# Patient Record
Sex: FEMALE | Race: BLACK OR AFRICAN AMERICAN | NOT HISPANIC OR LATINO
[De-identification: names, ages, dates, MRNs, and addresses within clinical notes are randomized per-mention and may not be internally consistent; named-entity substitution may affect disease eponyms.]

---

## 2017-01-19 ENCOUNTER — RESULT REVIEW (OUTPATIENT)
Age: 62
End: 2017-01-19

## 2017-02-21 ENCOUNTER — OUTPATIENT (OUTPATIENT)
Dept: OUTPATIENT SERVICES | Facility: HOSPITAL | Age: 62
LOS: 1 days | End: 2017-02-21
Payer: COMMERCIAL

## 2017-02-21 ENCOUNTER — APPOINTMENT (OUTPATIENT)
Dept: ULTRASOUND IMAGING | Facility: IMAGING CENTER | Age: 62
End: 2017-02-21

## 2017-02-21 ENCOUNTER — APPOINTMENT (OUTPATIENT)
Dept: MAMMOGRAPHY | Facility: IMAGING CENTER | Age: 62
End: 2017-02-21

## 2017-02-21 ENCOUNTER — APPOINTMENT (OUTPATIENT)
Dept: RADIOLOGY | Facility: IMAGING CENTER | Age: 62
End: 2017-02-21

## 2017-02-21 DIAGNOSIS — Z00.8 ENCOUNTER FOR OTHER GENERAL EXAMINATION: ICD-10-CM

## 2017-02-21 PROCEDURE — 77066 DX MAMMO INCL CAD BI: CPT

## 2017-02-21 PROCEDURE — 76830 TRANSVAGINAL US NON-OB: CPT

## 2017-02-21 PROCEDURE — 77080 DXA BONE DENSITY AXIAL: CPT

## 2017-02-21 PROCEDURE — G0279: CPT

## 2017-02-21 PROCEDURE — 76856 US EXAM PELVIC COMPLETE: CPT

## 2017-02-28 DIAGNOSIS — Z12.31 ENCOUNTER FOR SCREENING MAMMOGRAM FOR MALIGNANT NEOPLASM OF BREAST: ICD-10-CM

## 2017-02-28 DIAGNOSIS — R10.31 RIGHT LOWER QUADRANT PAIN: ICD-10-CM

## 2017-02-28 DIAGNOSIS — Z78.0 ASYMPTOMATIC MENOPAUSAL STATE: ICD-10-CM

## 2017-04-09 ENCOUNTER — EMERGENCY (EMERGENCY)
Facility: HOSPITAL | Age: 62
LOS: 1 days | Discharge: HOME | End: 2017-04-09
Attending: EMERGENCY MEDICINE | Admitting: EMERGENCY MEDICINE
Payer: COMMERCIAL

## 2017-04-09 VITALS
RESPIRATION RATE: 17 BRPM | OXYGEN SATURATION: 98 % | DIASTOLIC BLOOD PRESSURE: 99 MMHG | SYSTOLIC BLOOD PRESSURE: 159 MMHG | HEART RATE: 69 BPM

## 2017-04-09 VITALS
RESPIRATION RATE: 17 BRPM | TEMPERATURE: 98 F | DIASTOLIC BLOOD PRESSURE: 101 MMHG | OXYGEN SATURATION: 98 % | SYSTOLIC BLOOD PRESSURE: 165 MMHG | HEART RATE: 78 BPM

## 2017-04-09 DIAGNOSIS — E87.5 HYPERKALEMIA: ICD-10-CM

## 2017-04-09 LAB
ALBUMIN SERPL ELPH-MCNC: 3.8 G/DL — SIGNIFICANT CHANGE UP (ref 3.3–5)
ALP SERPL-CCNC: 67 U/L — SIGNIFICANT CHANGE UP (ref 40–120)
ALT FLD-CCNC: 22 U/L RC — SIGNIFICANT CHANGE UP (ref 10–45)
ANION GAP SERPL CALC-SCNC: 11 MMOL/L — SIGNIFICANT CHANGE UP (ref 5–17)
AST SERPL-CCNC: 20 U/L — SIGNIFICANT CHANGE UP (ref 10–40)
BILIRUB SERPL-MCNC: 0.7 MG/DL — SIGNIFICANT CHANGE UP (ref 0.2–1.2)
BUN SERPL-MCNC: 17 MG/DL — SIGNIFICANT CHANGE UP (ref 7–23)
CALCIUM SERPL-MCNC: 9.6 MG/DL — SIGNIFICANT CHANGE UP (ref 8.4–10.5)
CHLORIDE SERPL-SCNC: 104 MMOL/L — SIGNIFICANT CHANGE UP (ref 96–108)
CO2 SERPL-SCNC: 25 MMOL/L — SIGNIFICANT CHANGE UP (ref 22–31)
CREAT SERPL-MCNC: 1.05 MG/DL — SIGNIFICANT CHANGE UP (ref 0.5–1.3)
GAS PNL BLDV: SIGNIFICANT CHANGE UP
GLUCOSE SERPL-MCNC: 98 MG/DL — SIGNIFICANT CHANGE UP (ref 70–99)
HCT VFR BLD CALC: 36.7 % — SIGNIFICANT CHANGE UP (ref 34.5–45)
HGB BLD-MCNC: 12.8 G/DL — SIGNIFICANT CHANGE UP (ref 11.5–15.5)
MCHC RBC-ENTMCNC: 27.1 PG — SIGNIFICANT CHANGE UP (ref 27–34)
MCHC RBC-ENTMCNC: 34.7 GM/DL — SIGNIFICANT CHANGE UP (ref 32–36)
MCV RBC AUTO: 78.1 FL — LOW (ref 80–100)
PLATELET # BLD AUTO: 208 K/UL — SIGNIFICANT CHANGE UP (ref 150–400)
POTASSIUM SERPL-MCNC: 4.4 MMOL/L — SIGNIFICANT CHANGE UP (ref 3.5–5.3)
POTASSIUM SERPL-SCNC: 4.4 MMOL/L — SIGNIFICANT CHANGE UP (ref 3.5–5.3)
PROT SERPL-MCNC: 6.8 G/DL — SIGNIFICANT CHANGE UP (ref 6–8.3)
RBC # BLD: 4.7 M/UL — SIGNIFICANT CHANGE UP (ref 3.8–5.2)
RBC # FLD: 12.7 % — SIGNIFICANT CHANGE UP (ref 10.3–14.5)
SODIUM SERPL-SCNC: 140 MMOL/L — SIGNIFICANT CHANGE UP (ref 135–145)
WBC # BLD: 7.2 K/UL — SIGNIFICANT CHANGE UP (ref 3.8–10.5)
WBC # FLD AUTO: 7.2 K/UL — SIGNIFICANT CHANGE UP (ref 3.8–10.5)

## 2017-04-09 PROCEDURE — 82947 ASSAY GLUCOSE BLOOD QUANT: CPT

## 2017-04-09 PROCEDURE — 82803 BLOOD GASES ANY COMBINATION: CPT

## 2017-04-09 PROCEDURE — 99284 EMERGENCY DEPT VISIT MOD MDM: CPT | Mod: 25

## 2017-04-09 PROCEDURE — 84295 ASSAY OF SERUM SODIUM: CPT

## 2017-04-09 PROCEDURE — 84132 ASSAY OF SERUM POTASSIUM: CPT

## 2017-04-09 PROCEDURE — 99283 EMERGENCY DEPT VISIT LOW MDM: CPT | Mod: 25

## 2017-04-09 PROCEDURE — 85027 COMPLETE CBC AUTOMATED: CPT

## 2017-04-09 PROCEDURE — 82330 ASSAY OF CALCIUM: CPT

## 2017-04-09 PROCEDURE — 83605 ASSAY OF LACTIC ACID: CPT

## 2017-04-09 PROCEDURE — 82435 ASSAY OF BLOOD CHLORIDE: CPT

## 2017-04-09 PROCEDURE — 93005 ELECTROCARDIOGRAM TRACING: CPT

## 2017-04-09 PROCEDURE — 93010 ELECTROCARDIOGRAM REPORT: CPT

## 2017-04-09 PROCEDURE — 80053 COMPREHEN METABOLIC PANEL: CPT

## 2017-04-09 PROCEDURE — 85014 HEMATOCRIT: CPT

## 2017-04-09 NOTE — ED ADULT TRIAGE NOTE - CHIEF COMPLAINT QUOTE
Sent to ED by Dr Vanegas for high potassium (patient reports that potassium is 6.9). Blood was drawn yesterday.

## 2017-04-09 NOTE — ED PROVIDER NOTE - CARE PLAN
Principal Discharge DX:	Hyperkalemia  Goal:	Normal potassium level  Instructions for follow-up, activity and diet:	Please follow-up with your primary care physician within 24-28hrs  Please return to the ED for worsening or new symptoms

## 2017-04-09 NOTE — ED PROVIDER NOTE - ATTENDING CONTRIBUTION TO CARE
No acute symptoms, referred to ED for hyperkalemia on outpatient specimen, on my exam:  awake, alert, cooperative, pleasant

## 2017-04-09 NOTE — ED PROVIDER NOTE - OBJECTIVE STATEMENT
62 yo F PMHx HTN presents from home s/p receiving blood work results significant for K 6.9. Patient's PCP instructed patient to report to the ED for lab work and management. Patient has no complaints at this time. No chest pain, sob/dyspnea, abdominal pain, bleeding.    No EKG changes/abnormalities present on arrival

## 2017-04-09 NOTE — ED PROVIDER NOTE - PLAN OF CARE
Normal potassium level Please follow-up with your primary care physician within 24-28hrs  Please return to the ED for worsening or new symptoms

## 2017-04-09 NOTE — ED ADULT NURSE NOTE - OBJECTIVE STATEMENT
62 y/o F, reported to ED from home. A&Ox3, c/o abnormal labs. Pt reports that yesterday she had a physical preformed by her PMD. PMD called pt to tell her that her potassium was high. Pt states that the PMD said her potassium was 6.9. Pt denies any symptoms or complaints at this time. Pt denies LOC, H/A, or visual changes. Pt denies SOB, C/P, N/V/D, abd pain. Pt denies fever or chills. Pt denies pain. Will continue to monitor pt.

## 2017-04-09 NOTE — ED PROVIDER NOTE - MEDICAL DECISION MAKING DETAILS
60 yo F HTN presents from home for outpatient potassium of 6.9; here for repeat blood work +/- management  -CBC, CMP, VBG c lytes

## 2017-04-09 NOTE — ED ADULT NURSE NOTE - CHIEF COMPLAINT
The patient is a 61y Female complaining of The patient is a 61y Female complaining of abnormal labs.

## 2017-04-09 NOTE — ED PROVIDER NOTE - CHPI ED SYMPTOMS NEG
no vomiting/no fever/no dizziness/no chills/no tingling/no numbness/no pain/no weakness/no nausea/no decreased eating/drinking

## 2018-02-26 ENCOUNTER — OUTPATIENT (OUTPATIENT)
Dept: OUTPATIENT SERVICES | Facility: HOSPITAL | Age: 63
LOS: 1 days | End: 2018-02-26
Payer: COMMERCIAL

## 2018-02-26 ENCOUNTER — APPOINTMENT (OUTPATIENT)
Dept: MAMMOGRAPHY | Facility: IMAGING CENTER | Age: 63
End: 2018-02-26
Payer: COMMERCIAL

## 2018-02-26 DIAGNOSIS — Z00.8 ENCOUNTER FOR OTHER GENERAL EXAMINATION: ICD-10-CM

## 2018-02-26 PROCEDURE — 77063 BREAST TOMOSYNTHESIS BI: CPT | Mod: 26

## 2018-02-26 PROCEDURE — 77067 SCR MAMMO BI INCL CAD: CPT

## 2018-02-26 PROCEDURE — 77067 SCR MAMMO BI INCL CAD: CPT | Mod: 26

## 2018-02-26 PROCEDURE — 77063 BREAST TOMOSYNTHESIS BI: CPT

## 2019-02-28 ENCOUNTER — APPOINTMENT (OUTPATIENT)
Dept: MAMMOGRAPHY | Facility: IMAGING CENTER | Age: 64
End: 2019-02-28
Payer: COMMERCIAL

## 2019-02-28 ENCOUNTER — OUTPATIENT (OUTPATIENT)
Dept: OUTPATIENT SERVICES | Facility: HOSPITAL | Age: 64
LOS: 1 days | End: 2019-02-28
Payer: COMMERCIAL

## 2019-02-28 DIAGNOSIS — Z00.8 ENCOUNTER FOR OTHER GENERAL EXAMINATION: ICD-10-CM

## 2019-02-28 PROCEDURE — 77063 BREAST TOMOSYNTHESIS BI: CPT

## 2019-02-28 PROCEDURE — 77067 SCR MAMMO BI INCL CAD: CPT

## 2019-02-28 PROCEDURE — 77063 BREAST TOMOSYNTHESIS BI: CPT | Mod: 26

## 2019-02-28 PROCEDURE — 77067 SCR MAMMO BI INCL CAD: CPT | Mod: 26

## 2019-03-27 ENCOUNTER — RESULT REVIEW (OUTPATIENT)
Age: 64
End: 2019-03-27

## 2019-07-29 NOTE — ED PROVIDER NOTE - CROS ED EYES ALL NEG
no paresthesia/fingers/toes warm to touch/no cyanosis of extremity/capillary refill time < 2 seconds/no swelling
negative...

## 2019-08-26 ENCOUNTER — APPOINTMENT (OUTPATIENT)
Dept: ORTHOPEDIC SURGERY | Facility: CLINIC | Age: 64
End: 2019-08-26
Payer: COMMERCIAL

## 2019-08-26 VITALS
SYSTOLIC BLOOD PRESSURE: 137 MMHG | HEART RATE: 77 BPM | DIASTOLIC BLOOD PRESSURE: 83 MMHG | WEIGHT: 204 LBS | BODY MASS INDEX: 32.78 KG/M2 | HEIGHT: 66 IN

## 2019-08-26 DIAGNOSIS — Z82.61 FAMILY HISTORY OF ARTHRITIS: ICD-10-CM

## 2019-08-26 DIAGNOSIS — Z86.79 PERSONAL HISTORY OF OTHER DISEASES OF THE CIRCULATORY SYSTEM: ICD-10-CM

## 2019-08-26 PROCEDURE — 73562 X-RAY EXAM OF KNEE 3: CPT | Mod: LT

## 2019-08-26 PROCEDURE — 20610 DRAIN/INJ JOINT/BURSA W/O US: CPT | Mod: LT

## 2019-08-26 PROCEDURE — 99204 OFFICE O/P NEW MOD 45 MIN: CPT | Mod: 25

## 2019-08-26 RX ORDER — ASPIRIN 81 MG
81 TABLET, DELAYED RELEASE (ENTERIC COATED) ORAL
Refills: 0 | Status: ACTIVE | COMMUNITY

## 2019-08-26 RX ORDER — VALSARTAN 40 MG/1
TABLET, COATED ORAL
Refills: 0 | Status: ACTIVE | COMMUNITY

## 2019-08-26 RX ADMIN — METHYLPREDNISOLONE ACETATE 2 MG/ML: 40 INJECTION, SUSPENSION INTRALESIONAL; INTRAMUSCULAR; INTRASYNOVIAL; SOFT TISSUE at 00:00

## 2019-08-26 RX ADMIN — LIDOCAINE HYDROCHLORIDE 3 %: 10 INJECTION, SOLUTION INFILTRATION; PERINEURAL at 00:00

## 2019-08-30 RX ORDER — LIDOCAINE HYDROCHLORIDE 10 MG/ML
1 INJECTION, SOLUTION INFILTRATION; PERINEURAL
Refills: 0 | Status: COMPLETED | OUTPATIENT
Start: 2019-08-26

## 2019-08-30 RX ORDER — METHYLPRED ACET/NACL,ISO-OS/PF 40 MG/ML
40 VIAL (ML) INJECTION
Qty: 1 | Refills: 0 | Status: COMPLETED | OUTPATIENT
Start: 2019-08-26

## 2019-09-30 ENCOUNTER — APPOINTMENT (OUTPATIENT)
Dept: ORTHOPEDIC SURGERY | Facility: CLINIC | Age: 64
End: 2019-09-30

## 2019-12-15 ENCOUNTER — INPATIENT (INPATIENT)
Facility: HOSPITAL | Age: 64
LOS: 3 days | Discharge: HOME | DRG: 378 | End: 2019-12-19
Attending: INTERNAL MEDICINE | Admitting: INTERNAL MEDICINE
Payer: COMMERCIAL

## 2019-12-15 VITALS
WEIGHT: 199.08 LBS | HEART RATE: 101 BPM | RESPIRATION RATE: 18 BRPM | OXYGEN SATURATION: 99 % | TEMPERATURE: 98 F | SYSTOLIC BLOOD PRESSURE: 139 MMHG | DIASTOLIC BLOOD PRESSURE: 86 MMHG | HEIGHT: 65 IN

## 2019-12-15 DIAGNOSIS — K92.2 GASTROINTESTINAL HEMORRHAGE, UNSPECIFIED: ICD-10-CM

## 2019-12-15 LAB
ALBUMIN SERPL ELPH-MCNC: 3.9 G/DL — SIGNIFICANT CHANGE UP (ref 3.3–5)
ALP SERPL-CCNC: 69 U/L — SIGNIFICANT CHANGE UP (ref 40–120)
ALT FLD-CCNC: 19 U/L — SIGNIFICANT CHANGE UP (ref 10–45)
ANION GAP SERPL CALC-SCNC: 9 MMOL/L — SIGNIFICANT CHANGE UP (ref 5–17)
APTT BLD: 31.3 SEC — SIGNIFICANT CHANGE UP (ref 27.5–36.3)
AST SERPL-CCNC: 21 U/L — SIGNIFICANT CHANGE UP (ref 10–40)
BASOPHILS # BLD AUTO: 0.03 K/UL — SIGNIFICANT CHANGE UP (ref 0–0.2)
BASOPHILS NFR BLD AUTO: 0.3 % — SIGNIFICANT CHANGE UP (ref 0–2)
BILIRUB SERPL-MCNC: 0.7 MG/DL — SIGNIFICANT CHANGE UP (ref 0.2–1.2)
BLD GP AB SCN SERPL QL: NEGATIVE — SIGNIFICANT CHANGE UP
BUN SERPL-MCNC: 16 MG/DL — SIGNIFICANT CHANGE UP (ref 7–23)
CALCIUM SERPL-MCNC: 10.3 MG/DL — SIGNIFICANT CHANGE UP (ref 8.4–10.5)
CHLORIDE SERPL-SCNC: 106 MMOL/L — SIGNIFICANT CHANGE UP (ref 96–108)
CO2 SERPL-SCNC: 24 MMOL/L — SIGNIFICANT CHANGE UP (ref 22–31)
CREAT SERPL-MCNC: 1.25 MG/DL — SIGNIFICANT CHANGE UP (ref 0.5–1.3)
EOSINOPHIL # BLD AUTO: 0.15 K/UL — SIGNIFICANT CHANGE UP (ref 0–0.5)
EOSINOPHIL NFR BLD AUTO: 1.7 % — SIGNIFICANT CHANGE UP (ref 0–6)
GLUCOSE SERPL-MCNC: 93 MG/DL — SIGNIFICANT CHANGE UP (ref 70–99)
HCT VFR BLD CALC: 35 % — SIGNIFICANT CHANGE UP (ref 34.5–45)
HCT VFR BLD CALC: 39.7 % — SIGNIFICANT CHANGE UP (ref 34.5–45)
HGB BLD-MCNC: 11.3 G/DL — LOW (ref 11.5–15.5)
HGB BLD-MCNC: 12.9 G/DL — SIGNIFICANT CHANGE UP (ref 11.5–15.5)
IMM GRANULOCYTES NFR BLD AUTO: 0.2 % — SIGNIFICANT CHANGE UP (ref 0–1.5)
INR BLD: 1.05 RATIO — SIGNIFICANT CHANGE UP (ref 0.88–1.16)
LACTATE BLDV-MCNC: 1.4 MMOL/L — SIGNIFICANT CHANGE UP (ref 0.7–2)
LYMPHOCYTES # BLD AUTO: 2.39 K/UL — SIGNIFICANT CHANGE UP (ref 1–3.3)
LYMPHOCYTES # BLD AUTO: 26.6 % — SIGNIFICANT CHANGE UP (ref 13–44)
MCHC RBC-ENTMCNC: 25.3 PG — LOW (ref 27–34)
MCHC RBC-ENTMCNC: 25.6 PG — LOW (ref 27–34)
MCHC RBC-ENTMCNC: 32.3 GM/DL — SIGNIFICANT CHANGE UP (ref 32–36)
MCHC RBC-ENTMCNC: 32.5 GM/DL — SIGNIFICANT CHANGE UP (ref 32–36)
MCV RBC AUTO: 78.5 FL — LOW (ref 80–100)
MCV RBC AUTO: 78.8 FL — LOW (ref 80–100)
MONOCYTES # BLD AUTO: 0.8 K/UL — SIGNIFICANT CHANGE UP (ref 0–0.9)
MONOCYTES NFR BLD AUTO: 8.9 % — SIGNIFICANT CHANGE UP (ref 2–14)
NEUTROPHILS # BLD AUTO: 5.6 K/UL — SIGNIFICANT CHANGE UP (ref 1.8–7.4)
NEUTROPHILS NFR BLD AUTO: 62.3 % — SIGNIFICANT CHANGE UP (ref 43–77)
NRBC # BLD: 0 /100 WBCS — SIGNIFICANT CHANGE UP (ref 0–0)
NRBC # BLD: 0 /100 WBCS — SIGNIFICANT CHANGE UP (ref 0–0)
PLATELET # BLD AUTO: 236 K/UL — SIGNIFICANT CHANGE UP (ref 150–400)
PLATELET # BLD AUTO: 261 K/UL — SIGNIFICANT CHANGE UP (ref 150–400)
POTASSIUM SERPL-MCNC: 4 MMOL/L — SIGNIFICANT CHANGE UP (ref 3.5–5.3)
POTASSIUM SERPL-SCNC: 4 MMOL/L — SIGNIFICANT CHANGE UP (ref 3.5–5.3)
PROT SERPL-MCNC: 7.3 G/DL — SIGNIFICANT CHANGE UP (ref 6–8.3)
PROTHROM AB SERPL-ACNC: 12 SEC — SIGNIFICANT CHANGE UP (ref 10–12.9)
RBC # BLD: 4.46 M/UL — SIGNIFICANT CHANGE UP (ref 3.8–5.2)
RBC # BLD: 5.04 M/UL — SIGNIFICANT CHANGE UP (ref 3.8–5.2)
RBC # FLD: 13 % — SIGNIFICANT CHANGE UP (ref 10.3–14.5)
RBC # FLD: 13.2 % — SIGNIFICANT CHANGE UP (ref 10.3–14.5)
RH IG SCN BLD-IMP: POSITIVE — SIGNIFICANT CHANGE UP
SODIUM SERPL-SCNC: 139 MMOL/L — SIGNIFICANT CHANGE UP (ref 135–145)
WBC # BLD: 8.92 K/UL — SIGNIFICANT CHANGE UP (ref 3.8–10.5)
WBC # BLD: 8.99 K/UL — SIGNIFICANT CHANGE UP (ref 3.8–10.5)
WBC # FLD AUTO: 8.92 K/UL — SIGNIFICANT CHANGE UP (ref 3.8–10.5)
WBC # FLD AUTO: 8.99 K/UL — SIGNIFICANT CHANGE UP (ref 3.8–10.5)

## 2019-12-15 PROCEDURE — 99285 EMERGENCY DEPT VISIT HI MDM: CPT

## 2019-12-15 RX ORDER — SODIUM CHLORIDE 9 MG/ML
1000 INJECTION INTRAMUSCULAR; INTRAVENOUS; SUBCUTANEOUS ONCE
Refills: 0 | Status: COMPLETED | OUTPATIENT
Start: 2019-12-15 | End: 2019-12-16

## 2019-12-15 NOTE — ED ADULT NURSE NOTE - OBJECTIVE STATEMENT
pt 65 yo female presents to [place] area states rectal bleed x 3 today with stool onset 1pm pt states last colonoscopy 8 years ago mild lower abd cramping per patient pt accompnied by son to er pt vitals stable pt pending md evaluation

## 2019-12-15 NOTE — H&P ADULT - HISTORY OF PRESENT ILLNESS
63 y/o F h/o HTN on [id number], presenting with several episodes of BRBPR today.  3 episodes. 1st episode mixed with stool, next 2 all blood.  small amounts of clots.  No nausea/vomiting.  mild cramping in RLQ upon arrival to Ed.  No fever/chills.  Had a colonscopy (screening) ~8 y/ago; had a number of polyps removed, no cancer per patient; no prior h/o GI bleeding.     at present in ED : she has 2 more episode of BRBPR , denies any abd pain , Her H/H is droping   will order CT angie of abd/pelvis

## 2019-12-15 NOTE — H&P ADULT - PROBLEM SELECTOR PLAN 1
several episodes of BRBPR   on admission Hb 12.5 ---> 10   will obtain GI consult Dr. Mistry as per patinet request   -will get CT angio of abd and pelvis : if bleeder identified , then IR consult for possible embolization  -started on protonix IV   -keep her NPO

## 2019-12-15 NOTE — H&P ADULT - NSHPPHYSICALEXAM_GEN_ALL_CORE
pt. seen and examined, denies any abd pain     Vital Signs Last 24 Hrs  T(C): 36.7 (15 Dec 2019 22:52), Max: 36.9 (15 Dec 2019 14:36)  T(F): 98 (15 Dec 2019 22:52), Max: 98.4 (15 Dec 2019 14:36)  HR: 66 (16 Dec 2019 00:21) (66 - 101)  BP: 120/70 (16 Dec 2019 00:21) (82/55 - 139/86)  BP(mean): --  RR: 18 (15 Dec 2019 23:59) (16 - 20)  SpO2: 98% (15 Dec 2019 23:59) (98% - 99%)    heent: nc/at, no pallor  neck: supple, no JVD  Lungs: B/L clear, no w/r/r  heart: s1s2 nml  abd: soft, NABS, NT/ND  ext: no e/c/c, pulses 2+  neuro: aaox3, no focal deficit

## 2019-12-15 NOTE — ED PROVIDER NOTE - PROGRESS NOTE DETAILS
Attending note (Thuan): patient had large red blood bowel movement in toilet in [place] (observed by MD); concern for persistent/active lower GI bleed, but not hemodynamically unstable; initial hgb/hct normal (will repeat at 8pm); will admit for further evaluation/management.

## 2019-12-15 NOTE — H&P ADULT - NSHPLABSRESULTS_GEN_ALL_CORE
10.9   10.85 )-----------( 207      ( 16 Dec 2019 00:16 )             34.1     12-15    139  |  106  |  16  ----------------------------<  93  4.0   |  24  |  1.25    Ca    10.3      15 Dec 2019 16:14    TPro  7.3  /  Alb  3.9  /  TBili  0.7  /  DBili  x   /  AST  21  /  ALT  19  /  AlkPhos  69  12-15

## 2019-12-15 NOTE — ED PROVIDER NOTE - ATTENDING CONTRIBUTION TO CARE
65 y/o F h/o HTN on [id number], presenting with several episodes of BRBPR today.  3 episodes. 1st episode mixed with stool, next 2 all blood.  small amounts of clots.  No nausea/vomiting.  mild cramping in RLQ upon arrival to Ed.  No fever/chills.  Had a colonscopy (screening) ~8 y/ago; had a number of polyps removed, no cancer per patient; no prior h/o GI bleeding.      meds: diovan (160 bid), ASA 81mg  PMH: HTN  GI Janki  PCP: Jose Daniel    On Physical Exam:  General: well appearing, in NAD, speaking clearly in full sentences and without difficulty; cooperative with exam  HEENT: PERRL, MMM  Neck: no neck tenderness, no nuchal rigidity  Cardiac: normal s1, s2; RRR; no MGR  Lungs: CTABL  Abdomen: soft nontender/nondistended  : no bladder tenderness or distension  Skin: intact, no rash  Extremities: no peripheral edema, no gross deformities  Neuro: no gross neurologic deficits     AP:  65y/o F h/o HTN on ASA 81mg qd presenting with mutliple episodes of BRBPR today concerning for lower GI bleed; no abdominal tenderness, hemodynamically stable; will check labs, given use of antiplatelets and multiple episodes, may need admission for monitoring, further GI w/u. transfusion if significantly anemic.      ED Course: patient had large red blood bowel movement in toilet in [place] (observed by MD); concern for persistent/active lower GI bleed, but not hemodynamically unstable; initial hgb/hct normal (will repeat at 8pm); will admit for further evaluation/management.

## 2019-12-15 NOTE — ED PROVIDER NOTE - CLINICAL SUMMARY MEDICAL DECISION MAKING FREE TEXT BOX
Attending note (Thuan): 63y/o F h/o HTN on ASA 81mg qd presenting with mutliple episodes of BRBPR today concerning for lower GI bleed; no abdominal tenderness, hemodynamically stable; will check labs, given use of antiplatelets and multiple episodes, may need admission for monitoring, further GI w/u. transfusion if significantly anemic.

## 2019-12-15 NOTE — ED PROVIDER NOTE - OBJECTIVE STATEMENT
Attending note (Thuan): 63 y/o F h/o HTN on [id number], presenting with several episodes of BRBPR today.  3 episodes. 1st episode mixed with stool, next 2 all blood.  small amounts of clots.  No nausea/vomiting.  mild cramping in RLQ upon arrival to Ed.  No fever/chills.  Had a colonscopy (screening) ~8 y/ago; had a number of polyps removed, no cancer per patient; no prior h/o GI bleeding.      meds: diovan (160 bid), ASA 81mg  PMH: HTN  GI Janki  PCP: Jose Daniel

## 2019-12-16 DIAGNOSIS — D64.9 ANEMIA, UNSPECIFIED: ICD-10-CM

## 2019-12-16 DIAGNOSIS — N17.9 ACUTE KIDNEY FAILURE, UNSPECIFIED: ICD-10-CM

## 2019-12-16 DIAGNOSIS — K92.2 GASTROINTESTINAL HEMORRHAGE, UNSPECIFIED: ICD-10-CM

## 2019-12-16 DIAGNOSIS — I10 ESSENTIAL (PRIMARY) HYPERTENSION: ICD-10-CM

## 2019-12-16 LAB
ALBUMIN SERPL ELPH-MCNC: 3.5 G/DL — SIGNIFICANT CHANGE UP (ref 3.3–5)
ALP SERPL-CCNC: 60 U/L — SIGNIFICANT CHANGE UP (ref 40–120)
ALT FLD-CCNC: 14 U/L — SIGNIFICANT CHANGE UP (ref 10–45)
ANION GAP SERPL CALC-SCNC: 12 MMOL/L — SIGNIFICANT CHANGE UP (ref 5–17)
AST SERPL-CCNC: 18 U/L — SIGNIFICANT CHANGE UP (ref 10–40)
BILIRUB SERPL-MCNC: 0.5 MG/DL — SIGNIFICANT CHANGE UP (ref 0.2–1.2)
BUN SERPL-MCNC: 15 MG/DL — SIGNIFICANT CHANGE UP (ref 7–23)
CALCIUM SERPL-MCNC: 9.7 MG/DL — SIGNIFICANT CHANGE UP (ref 8.4–10.5)
CHLORIDE SERPL-SCNC: 111 MMOL/L — HIGH (ref 96–108)
CO2 SERPL-SCNC: 21 MMOL/L — LOW (ref 22–31)
CREAT SERPL-MCNC: 0.9 MG/DL — SIGNIFICANT CHANGE UP (ref 0.5–1.3)
GLUCOSE SERPL-MCNC: 119 MG/DL — HIGH (ref 70–99)
HCT VFR BLD CALC: 32 % — LOW (ref 34.5–45)
HCT VFR BLD CALC: 34 % — LOW (ref 34.5–45)
HCT VFR BLD CALC: 34.1 % — LOW (ref 34.5–45)
HCV AB S/CO SERPL IA: 0.12 S/CO — SIGNIFICANT CHANGE UP (ref 0–0.99)
HCV AB SERPL-IMP: SIGNIFICANT CHANGE UP
HGB BLD-MCNC: 10.5 G/DL — LOW (ref 11.5–15.5)
HGB BLD-MCNC: 10.8 G/DL — LOW (ref 11.5–15.5)
HGB BLD-MCNC: 10.9 G/DL — LOW (ref 11.5–15.5)
MCHC RBC-ENTMCNC: 25.2 PG — LOW (ref 27–34)
MCHC RBC-ENTMCNC: 25.4 PG — LOW (ref 27–34)
MCHC RBC-ENTMCNC: 25.9 PG — LOW (ref 27–34)
MCHC RBC-ENTMCNC: 31.8 GM/DL — LOW (ref 32–36)
MCHC RBC-ENTMCNC: 32 GM/DL — SIGNIFICANT CHANGE UP (ref 32–36)
MCHC RBC-ENTMCNC: 32.8 GM/DL — SIGNIFICANT CHANGE UP (ref 32–36)
MCV RBC AUTO: 78.8 FL — LOW (ref 80–100)
MCV RBC AUTO: 78.9 FL — LOW (ref 80–100)
MCV RBC AUTO: 80 FL — SIGNIFICANT CHANGE UP (ref 80–100)
NRBC # BLD: 0 /100 WBCS — SIGNIFICANT CHANGE UP (ref 0–0)
PLATELET # BLD AUTO: 207 K/UL — SIGNIFICANT CHANGE UP (ref 150–400)
PLATELET # BLD AUTO: 212 K/UL — SIGNIFICANT CHANGE UP (ref 150–400)
PLATELET # BLD AUTO: 240 K/UL — SIGNIFICANT CHANGE UP (ref 150–400)
POTASSIUM SERPL-MCNC: 4.3 MMOL/L — SIGNIFICANT CHANGE UP (ref 3.5–5.3)
POTASSIUM SERPL-SCNC: 4.3 MMOL/L — SIGNIFICANT CHANGE UP (ref 3.5–5.3)
PROT SERPL-MCNC: 6.4 G/DL — SIGNIFICANT CHANGE UP (ref 6–8.3)
RBC # BLD: 4.06 M/UL — SIGNIFICANT CHANGE UP (ref 3.8–5.2)
RBC # BLD: 4.25 M/UL — SIGNIFICANT CHANGE UP (ref 3.8–5.2)
RBC # BLD: 4.32 M/UL — SIGNIFICANT CHANGE UP (ref 3.8–5.2)
RBC # FLD: 13.2 % — SIGNIFICANT CHANGE UP (ref 10.3–14.5)
RBC # FLD: 13.2 % — SIGNIFICANT CHANGE UP (ref 10.3–14.5)
RBC # FLD: 13.3 % — SIGNIFICANT CHANGE UP (ref 10.3–14.5)
SODIUM SERPL-SCNC: 144 MMOL/L — SIGNIFICANT CHANGE UP (ref 135–145)
WBC # BLD: 10.36 K/UL — SIGNIFICANT CHANGE UP (ref 3.8–10.5)
WBC # BLD: 10.85 K/UL — HIGH (ref 3.8–10.5)
WBC # BLD: 8.07 K/UL — SIGNIFICANT CHANGE UP (ref 3.8–10.5)
WBC # FLD AUTO: 10.36 K/UL — SIGNIFICANT CHANGE UP (ref 3.8–10.5)
WBC # FLD AUTO: 10.85 K/UL — HIGH (ref 3.8–10.5)
WBC # FLD AUTO: 8.07 K/UL — SIGNIFICANT CHANGE UP (ref 3.8–10.5)

## 2019-12-16 PROCEDURE — 74177 CT ABD & PELVIS W/CONTRAST: CPT | Mod: 26

## 2019-12-16 RX ORDER — SODIUM CHLORIDE 9 MG/ML
1000 INJECTION, SOLUTION INTRAVENOUS
Refills: 0 | Status: DISCONTINUED | OUTPATIENT
Start: 2019-12-16 | End: 2019-12-18

## 2019-12-16 RX ORDER — SOD SULF/SODIUM/NAHCO3/KCL/PEG
4000 SOLUTION, RECONSTITUTED, ORAL ORAL ONCE
Refills: 0 | Status: COMPLETED | OUTPATIENT
Start: 2019-12-16 | End: 2019-12-16

## 2019-12-16 RX ORDER — PANTOPRAZOLE SODIUM 20 MG/1
40 TABLET, DELAYED RELEASE ORAL
Refills: 0 | Status: DISCONTINUED | OUTPATIENT
Start: 2019-12-16 | End: 2019-12-19

## 2019-12-16 RX ADMIN — PANTOPRAZOLE SODIUM 40 MILLIGRAM(S): 20 TABLET, DELAYED RELEASE ORAL at 17:10

## 2019-12-16 RX ADMIN — SODIUM CHLORIDE 1000 MILLILITER(S): 9 INJECTION INTRAMUSCULAR; INTRAVENOUS; SUBCUTANEOUS at 00:03

## 2019-12-16 RX ADMIN — PANTOPRAZOLE SODIUM 40 MILLIGRAM(S): 20 TABLET, DELAYED RELEASE ORAL at 05:21

## 2019-12-16 RX ADMIN — Medication 4000 MILLILITER(S): at 17:09

## 2019-12-16 RX ADMIN — SODIUM CHLORIDE 100 MILLILITER(S): 9 INJECTION, SOLUTION INTRAVENOUS at 18:44

## 2019-12-16 RX ADMIN — SODIUM CHLORIDE 100 MILLILITER(S): 9 INJECTION, SOLUTION INTRAVENOUS at 22:03

## 2019-12-16 RX ADMIN — SODIUM CHLORIDE 100 MILLILITER(S): 9 INJECTION, SOLUTION INTRAVENOUS at 01:52

## 2019-12-16 NOTE — PROGRESS NOTE ADULT - PROBLEM SELECTOR PLAN 1
several episodes of BRBPR   npo, ivf, ppi, bowel prep for planned colonoscopy   monitor h/h and clinical status closely   adjust per consultants

## 2019-12-16 NOTE — ED ADULT NURSE REASSESSMENT NOTE - NS ED NURSE REASSESS COMMENT FT1
Patient ambulated to bathroom with steady coordinated gait, denied dizziness, weakness, confirms one episode of bloody stool with clots. VSS, will continue to monitor.

## 2019-12-16 NOTE — ED ADULT NURSE REASSESSMENT NOTE - NS ED NURSE REASSESS COMMENT FT1
Received report from previous shift RN. Patient resting in bed with no acute distress noted. Patient reporting mild RLQ abd discomfort, 3/10. Last episode of rectal bleeding this morning about midnight. Patient denies current dizziness, HA, CP, palpitations, n/v/d, fever, chills. Patient aware of plan of care for admission, awaiting bed assignment.

## 2019-12-16 NOTE — ED ADULT NURSE REASSESSMENT NOTE - NS ED NURSE REASSESS COMMENT FT1
Patient states, " I feel like I am going to pass out." BP 80/50, Admitting NP Jewell Called, 1 L NS started and STAT CBC drawn and sent per NP order, BP improved to 90s/60s, patient states improvement. Will continue to monitor.

## 2019-12-16 NOTE — PROGRESS NOTE ADULT - SUBJECTIVE AND OBJECTIVE BOX
Patient seen and examined at bedside  No acute events noted overnight  Case discussed with medical team    HPI:  65 y/o F h/o HTN on [id number], presenting with several episodes of BRBPR today.  3 episodes. 1st episode mixed with stool, next 2 all blood.  small amounts of clots.  No nausea/vomiting.  mild cramping in RLQ upon arrival to Ed.  No fever/chills.  Had a colonscopy (screening) ~8 y/ago; had a number of polyps removed, no cancer per patient; no prior h/o GI bleeding.     at present in ED : she has 2 more episode of BRBPR , denies any abd pain , Her H/H is droping   will order CT angie of abd/pelvis (15 Dec 2019 22:09)      PAST MEDICAL & SURGICAL HISTORY:  HTN (hypertension)  No significant past surgical history      No Known Allergies       MEDICATIONS  (STANDING):  dextrose 5% + sodium chloride 0.9%. 1000 milliLiter(s) (100 mL/Hr) IV Continuous <Continuous>  pantoprazole  Injectable 40 milliGRAM(s) IV Push two times a day  polyethylene glycol/electrolyte Solution. 4000 milliLiter(s) Oral once    MEDICATIONS  (PRN):      REVIEW OF SYSTEMS:  CONSTITUTIONAL: (+) malaise. fatigue.  EYES: No acute change in vision   ENT:  No tinnitus  NECK: No stiffness  RESPIRATORY: No hemoptysis  CARDIOVASCULAR:dec exercise tolerance. No chest pain, palpitations, syncope  GASTROINTESTINAL: brbpr.  GENITOURINARY: No hematuria  NEUROLOGICAL: No headaches  LYMPH Nodes: No enlarged glands  ENDOCRINE: No heat or cold intolerance	    T(C): 36.4 (12-16-19 @ 13:56), Max: 36.9 (12-16-19 @ 06:33)  HR: 70 (12-16-19 @ 13:56) (66 - 90)  BP: 112/71 (12-16-19 @ 13:56) (82/55 - 129/81)  RR: 18 (12-16-19 @ 13:56) (16 - 20)  SpO2: 96% (12-16-19 @ 08:45) (96% - 99%)    PHYSICAL EXAMINATION:   Constitutional:  NAD  HEENT: AT  Neck:  Supple  Respiratory:  Adequate airflow b/l. Not using accessory muscles of respiration.  Cardiovascular:  sys murmur. S1 & S2 intact, no R/G, 2+ radial pulses b/l  Gastrointestinal: Soft, NT, ND, normoactive b.s., no organomegaly/RT/rigidity  Extremities: WWP  Neurological:  Alert and awake.  No acute focal motor deficits. Crude sensation intact.     Labs and imaging reviewed    LABS:                        10.5   10.36 )-----------( 212      ( 16 Dec 2019 08:23 )             32.0     12-16    144  |  111<H>  |  15  ----------------------------<  119<H>  4.3   |  21<L>  |  0.90    Ca    9.7      16 Dec 2019 05:57    TPro  6.4  /  Alb  3.5  /  TBili  0.5  /  DBili  x   /  AST  18  /  ALT  14  /  AlkPhos  60  12-16        PT/INR - ( 15 Dec 2019 16:14 )   PT: 12.0 sec;   INR: 1.05 ratio         PTT - ( 15 Dec 2019 16:14 )  PTT:31.3 sec    CAPILLARY BLOOD GLUCOSE            LIVER FUNCTIONS - ( 16 Dec 2019 05:57 )  Alb: 3.5 g/dL / Pro: 6.4 g/dL / ALK PHOS: 60 U/L / ALT: 14 U/L / AST: 18 U/L / GGT: x               RADIOLOGY & ADDITIONAL STUDIES:

## 2019-12-16 NOTE — PROGRESS NOTE ADULT - PROBLEM SELECTOR PLAN 2
hold of on BP meds for now in presence of GI bleed   -c/w IV fluids  monitor vitals and adjust rx prn

## 2019-12-17 ENCOUNTER — TRANSCRIPTION ENCOUNTER (OUTPATIENT)
Age: 64
End: 2019-12-17

## 2019-12-17 LAB
ANION GAP SERPL CALC-SCNC: 10 MMOL/L — SIGNIFICANT CHANGE UP (ref 5–17)
BASOPHILS # BLD AUTO: 0.04 K/UL — SIGNIFICANT CHANGE UP (ref 0–0.2)
BASOPHILS NFR BLD AUTO: 0.3 % — SIGNIFICANT CHANGE UP (ref 0–2)
BUN SERPL-MCNC: 9 MG/DL — SIGNIFICANT CHANGE UP (ref 7–23)
CALCIUM SERPL-MCNC: 9.2 MG/DL — SIGNIFICANT CHANGE UP (ref 8.4–10.5)
CHLORIDE SERPL-SCNC: 113 MMOL/L — HIGH (ref 96–108)
CO2 SERPL-SCNC: 24 MMOL/L — SIGNIFICANT CHANGE UP (ref 22–31)
CREAT SERPL-MCNC: 0.9 MG/DL — SIGNIFICANT CHANGE UP (ref 0.5–1.3)
EOSINOPHIL # BLD AUTO: 0.19 K/UL — SIGNIFICANT CHANGE UP (ref 0–0.5)
EOSINOPHIL NFR BLD AUTO: 1.6 % — SIGNIFICANT CHANGE UP (ref 0–6)
GLUCOSE SERPL-MCNC: 95 MG/DL — SIGNIFICANT CHANGE UP (ref 70–99)
HCT VFR BLD CALC: 27.1 % — LOW (ref 34.5–45)
HCT VFR BLD CALC: 27.6 % — LOW (ref 34.5–45)
HCT VFR BLD CALC: 29.7 % — LOW (ref 34.5–45)
HGB BLD-MCNC: 8.9 G/DL — LOW (ref 11.5–15.5)
HGB BLD-MCNC: 9.1 G/DL — LOW (ref 11.5–15.5)
HGB BLD-MCNC: 9.5 G/DL — LOW (ref 11.5–15.5)
IMM GRANULOCYTES NFR BLD AUTO: 0.6 % — SIGNIFICANT CHANGE UP (ref 0–1.5)
LYMPHOCYTES # BLD AUTO: 2.52 K/UL — SIGNIFICANT CHANGE UP (ref 1–3.3)
LYMPHOCYTES # BLD AUTO: 21.7 % — SIGNIFICANT CHANGE UP (ref 13–44)
MCHC RBC-ENTMCNC: 25.5 PG — LOW (ref 27–34)
MCHC RBC-ENTMCNC: 26 PG — LOW (ref 27–34)
MCHC RBC-ENTMCNC: 26.1 PG — LOW (ref 27–34)
MCHC RBC-ENTMCNC: 32 GM/DL — SIGNIFICANT CHANGE UP (ref 32–36)
MCHC RBC-ENTMCNC: 32.8 GM/DL — SIGNIFICANT CHANGE UP (ref 32–36)
MCHC RBC-ENTMCNC: 33 GM/DL — SIGNIFICANT CHANGE UP (ref 32–36)
MCV RBC AUTO: 79.1 FL — LOW (ref 80–100)
MCV RBC AUTO: 79.2 FL — LOW (ref 80–100)
MCV RBC AUTO: 79.8 FL — LOW (ref 80–100)
MONOCYTES # BLD AUTO: 0.89 K/UL — SIGNIFICANT CHANGE UP (ref 0–0.9)
MONOCYTES NFR BLD AUTO: 7.7 % — SIGNIFICANT CHANGE UP (ref 2–14)
NEUTROPHILS # BLD AUTO: 7.88 K/UL — HIGH (ref 1.8–7.4)
NEUTROPHILS NFR BLD AUTO: 68.1 % — SIGNIFICANT CHANGE UP (ref 43–77)
NRBC # BLD: 0 /100 WBCS — SIGNIFICANT CHANGE UP (ref 0–0)
PLATELET # BLD AUTO: 179 K/UL — SIGNIFICANT CHANGE UP (ref 150–400)
PLATELET # BLD AUTO: 185 K/UL — SIGNIFICANT CHANGE UP (ref 150–400)
PLATELET # BLD AUTO: 221 K/UL — SIGNIFICANT CHANGE UP (ref 150–400)
POTASSIUM SERPL-MCNC: 3.8 MMOL/L — SIGNIFICANT CHANGE UP (ref 3.5–5.3)
POTASSIUM SERPL-SCNC: 3.8 MMOL/L — SIGNIFICANT CHANGE UP (ref 3.5–5.3)
RBC # BLD: 3.42 M/UL — LOW (ref 3.8–5.2)
RBC # BLD: 3.49 M/UL — LOW (ref 3.8–5.2)
RBC # BLD: 3.72 M/UL — LOW (ref 3.8–5.2)
RBC # FLD: 13.2 % — SIGNIFICANT CHANGE UP (ref 10.3–14.5)
RBC # FLD: 13.2 % — SIGNIFICANT CHANGE UP (ref 10.3–14.5)
RBC # FLD: 13.5 % — SIGNIFICANT CHANGE UP (ref 10.3–14.5)
SODIUM SERPL-SCNC: 147 MMOL/L — HIGH (ref 135–145)
WBC # BLD: 11.59 K/UL — HIGH (ref 3.8–10.5)
WBC # BLD: 7.64 K/UL — SIGNIFICANT CHANGE UP (ref 3.8–10.5)
WBC # BLD: 8.57 K/UL — SIGNIFICANT CHANGE UP (ref 3.8–10.5)
WBC # FLD AUTO: 11.59 K/UL — HIGH (ref 3.8–10.5)
WBC # FLD AUTO: 7.64 K/UL — SIGNIFICANT CHANGE UP (ref 3.8–10.5)
WBC # FLD AUTO: 8.57 K/UL — SIGNIFICANT CHANGE UP (ref 3.8–10.5)

## 2019-12-17 RX ADMIN — PANTOPRAZOLE SODIUM 40 MILLIGRAM(S): 20 TABLET, DELAYED RELEASE ORAL at 18:05

## 2019-12-17 RX ADMIN — SODIUM CHLORIDE 100 MILLILITER(S): 9 INJECTION, SOLUTION INTRAVENOUS at 19:25

## 2019-12-17 RX ADMIN — PANTOPRAZOLE SODIUM 40 MILLIGRAM(S): 20 TABLET, DELAYED RELEASE ORAL at 05:25

## 2019-12-17 NOTE — PROGRESS NOTE ADULT - SUBJECTIVE AND OBJECTIVE BOX
TREVOR [id number],   64yFemale followed for:  No Known Allergies    PAST MEDICAL & SURGICAL HISTORY:  HTN (hypertension)  No significant past surgical history    FAMILY HISTORY:    MEDICATIONS  (STANDING):  dextrose 5% + sodium chloride 0.9%. 1000 milliLiter(s) (100 mL/Hr) IV Continuous <Continuous>  pantoprazole  Injectable 40 milliGRAM(s) IV Push two times a day    MEDICATIONS  (PRN):      Vital Signs Last 24 Hrs  T(C): 36.6 (17 Dec 2019 14:38), Max: 37 (17 Dec 2019 04:32)  T(F): 97.8 (17 Dec 2019 14:38), Max: 98.6 (17 Dec 2019 04:32)  HR: 84 (17 Dec 2019 14:38) (78 - 93)  BP: 118/68 (17 Dec 2019 14:38) (118/68 - 128/77)  BP(mean): --  RR: 18 (17 Dec 2019 14:38) (17 - 19)  SpO2: 98% (17 Dec 2019 14:38) (98% - 99%)  nc/at  s1s2  cta  soft, nt, nd no guarding or rebound  no c/c/e    CBC Full  -  ( 17 Dec 2019 09:22 )  WBC Count : 7.64 K/uL  RBC Count : 3.42 M/uL  Hemoglobin : 8.9 g/dL  Hematocrit : 27.1 %  Platelet Count - Automated : 185 K/uL  Mean Cell Volume : 79.2 fl  Mean Cell Hemoglobin : 26.0 pg  Mean Cell Hemoglobin Concentration : 32.8 gm/dL  Auto Neutrophil # : x  Auto Lymphocyte # : x  Auto Monocyte # : x  Auto Eosinophil # : x  Auto Basophil # : x  Auto Neutrophil % : x  Auto Lymphocyte % : x  Auto Monocyte % : x  Auto Eosinophil % : x  Auto Basophil % : x    12-17    147<H>  |  113<H>  |  9   ----------------------------<  95  3.8   |  24  |  0.90    Ca    9.2      17 Dec 2019 06:44    TPro  6.4  /  Alb  3.5  /  TBili  0.5  /  DBili  x   /  AST  18  /  ALT  14  /  AlkPhos  60  12-16

## 2019-12-17 NOTE — CONSULT NOTE ADULT - ASSESSMENT
- Full note to follow    [id number] 64F Hx Htn, on ppx [id number] presented with a GI bleed 2 days ago. She has not received any transfusions, she underwent colonoscopy that did not identify a source of bleed.     - The patient is clinically stable without current GI bleed.  - Should she re-bleed, please obtain STAT bleeding scan. - Discussed with floor NP.  - Trend H/H and transfuse PRN.   - Surgery will follow.    Green  [phone]

## 2019-12-17 NOTE — PROGRESS NOTE ADULT - SUBJECTIVE AND OBJECTIVE BOX
Patient seen and examined at bedside  s/p bowel prep  persistent brbpr noted overnight  Case discussed with medical team    HPI:  65 y/o F h/o HTN on [id number], presenting with several episodes of BRBPR today.  3 episodes. 1st episode mixed with stool, next 2 all blood.  small amounts of clots.  No nausea/vomiting.  mild cramping in RLQ upon arrival to Ed.  No fever/chills.  Had a colonscopy (screening) ~8 y/ago; had a number of polyps removed, no cancer per patient; no prior h/o GI bleeding.     at present in ED : she has 2 more episode of BRBPR , denies any abd pain , Her H/H is droping   will order CT angie of abd/pelvis (15 Dec 2019 22:09)      PAST MEDICAL & SURGICAL HISTORY:  HTN (hypertension)  No significant past surgical history      No Known Allergies       MEDICATIONS  (STANDING):  dextrose 5% + sodium chloride 0.9%. 1000 milliLiter(s) (100 mL/Hr) IV Continuous <Continuous>  pantoprazole  Injectable 40 milliGRAM(s) IV Push two times a day    MEDICATIONS  (PRN):      REVIEW OF SYSTEMS:  CONSTITUTIONAL: (+) malaise.   EYES: No acute change in vision   ENT:  No tinnitus  NECK: No stiffness  RESPIRATORY: No hemoptysis  CARDIOVASCULAR: No chest pain, palpitations, syncope  GASTROINTESTINAL: s/p bowel prep, persistent brbpr noted overnight  GENITOURINARY: No hematuria  NEUROLOGICAL: No headaches  LYMPH Nodes: No enlarged glands  ENDOCRINE: No heat or cold intolerance	    T(C): 37 (12-17-19 @ 04:32), Max: 37 (12-17-19 @ 04:32)  HR: 78 (12-17-19 @ 04:32) (70 - 96)  BP: 128/77 (12-17-19 @ 04:32) (112/71 - 128/77)  RR: 17 (12-17-19 @ 04:32) (17 - 19)  SpO2: 98% (12-17-19 @ 04:32) (97% - 99%)    PHYSICAL EXAMINATION:   Constitutional: WD, NAD  HEENT: NC, AT  Neck:  Supple  Respiratory:  Adequate airflow b/l. Not using accessory muscles of respiration.  Cardiovascular:  S1 & S2 intact, no R/G, 2+ radial pulses b/l  Gastrointestinal: Soft, NT, ND, normoactive b.s., no organomegaly/RT/rigidity  Extremities: WWP  Neurological:  Alert and awake.  No acute focal motor deficits. Crude sensation intact.     Labs and imaging reviewed    LABS:                        8.9    7.64  )-----------( 185      ( 17 Dec 2019 09:22 )             27.1     12-17    147<H>  |  113<H>  |  9   ----------------------------<  95  3.8   |  24  |  0.90    Ca    9.2      17 Dec 2019 06:44    TPro  6.4  /  Alb  3.5  /  TBili  0.5  /  DBili  x   /  AST  18  /  ALT  14  /  AlkPhos  60  12-16        PT/INR - ( 15 Dec 2019 16:14 )   PT: 12.0 sec;   INR: 1.05 ratio         PTT - ( 15 Dec 2019 16:14 )  PTT:31.3 sec    CAPILLARY BLOOD GLUCOSE            LIVER FUNCTIONS - ( 16 Dec 2019 05:57 )  Alb: 3.5 g/dL / Pro: 6.4 g/dL / ALK PHOS: 60 U/L / ALT: 14 U/L / AST: 18 U/L / GGT: x               RADIOLOGY & ADDITIONAL STUDIES:

## 2019-12-17 NOTE — PROGRESS NOTE ADULT - SUBJECTIVE AND OBJECTIVE BOX
Patient is a 64y old  Female who presents with a chief complaint of BRBPR (17 Dec 2019 17:03)      INTERVAL HISTORY: feels ok, s/p colonoscopy    PHYSICAL EXAM:  T(C): 37.1 (12-17-19 @ 20:57), Max: 37.1 (12-17-19 @ 20:57)  HR: 98 (12-17-19 @ 20:57) (78 - 98)  BP: 139/89 (12-17-19 @ 20:57) (118/68 - 139/89)  RR: 17 (12-17-19 @ 20:57) (17 - 18)  SpO2: 96% (12-17-19 @ 20:57) (96% - 98%)  Wt(kg): --  I&O's Summary    16 Dec 2019 07:01  -  17 Dec 2019 07:00  --------------------------------------------------------  IN: 1200 mL / OUT: 0 mL / NET: 1200 mL    17 Dec 2019 07:01  -  17 Dec 2019 22:44  --------------------------------------------------------  IN: 200 mL / OUT: 0 mL / NET: 200 mL          Appearance: In no distress	  HEENT:    PERRL, EOMI	  Cardiovascular:  S1 S2, No JVD  Respiratory: Lungs clear to auscultation	  Gastrointestinal:  Soft, Non-tender, + BS	  Extremities:  No edema of LE                                9.5    11.59 )-----------( 221      ( 17 Dec 2019 21:19 )             29.7     12-17    147<H>  |  113<H>  |  9   ----------------------------<  95  3.8   |  24  |  0.90    Ca    9.2      17 Dec 2019 06:44    TPro  6.4  /  Alb  3.5  /  TBili  0.5  /  DBili  x   /  AST  18  /  ALT  14  /  AlkPhos  60  12-16        Labs personally reviewed    < from: Colonoscopy (12.17.19 @ 16:39) >  Findings:       The perianal and digital rectal examinations were normal. diverticulosis and blood throught        colon                                                                                                        Impression:          - Preparation of the colon was poor. secondary to blood throught colon,                        vigorously washed colon. pan divertiulsisi                       - No specimens collected.  Recommendation:      - clear liquids. pt with bleeding diverticular. dr. fritz consulted for                        colorectal. I do not think based on lack of transfusion fast enough bleeding                        for angio. if increased bleeding or decreased hgb would repeat ct angio vs                        bleeding scan.    < end of copied text >      Assessment and Plan:   Problem/Plan - 1:  ·  Problem: GI bleed.  Plan: several episodes of BRBPR   - s/p colonoscopy with diverticular bleed  - h/h stable now    Problem/Plan - 2:  ·  Problem: HTN (hypertension).  Plan: hold of on BP meds for now in presence of GI bleed   - BP well controlled    Problem/Plan - 3:  ·  Problem: Preop risk stratification.  Plan: tolerated procedure well with no complicationsz    Problem/Plan - 4:  ·  Problem: JONATHAN (acute kidney injury).  Plan: on admission, resolved.             Pepe Castañeda DO [facility]  Cardiovascular Medicine  [address], [address]  Office [phone]  Cell [phone]

## 2019-12-17 NOTE — PROGRESS NOTE ADULT - SUBJECTIVE AND OBJECTIVE BOX
Pre-Endoscopy Evaluation      Referring Physician: dr. oscar germain                                  Procedure: colonoscopy    Indication for Procedure: gib    Pertinent History: 64y old female with PMH of HTN on ASA 81mg, presenting with several episodes of BRBPR        Sedation by Anesthesia [X]    PAST MEDICAL & SURGICAL HISTORY:  HTN (hypertension)  No significant past surgical history      PMH of Gastroparesis [ ]  Gastric Surgery [ ]  Gastric Outlet Obstruction [ ]    Allergies:    No Known Allergies    Intolerances:      Latex allergy: [ ] yes [x] no    Medications:MEDICATIONS  (STANDING):  dextrose 5% + sodium chloride 0.9%. 1000 milliLiter(s) (100 mL/Hr) IV Continuous <Continuous>  pantoprazole  Injectable 40 milliGRAM(s) IV Push two times a day    MEDICATIONS  (PRN):      Smoking: [ ] yes  [x] no    AICD/PPM: [ ] yes   [x] no    Pertinent lab data:                        8.9    7.64  )-----------( 185      ( 17 Dec 2019 09:22 )             27.1     12-17    147<H>  |  113<H>  |  9   ----------------------------<  95  3.8   |  24  |  0.90    Ca    9.2      17 Dec 2019 06:44    TPro  6.4  /  Alb  3.5  /  TBili  0.5  /  DBili  x   /  AST  18  /  ALT  14  /  AlkPhos  60  12-16    PT/INR - ( 15 Dec 2019 16:14 )   PT: 12.0 sec;   INR: 1.05 ratio      PTT - ( 15 Dec 2019 16:14 )  PTT:31.3 sec        Physical Examination:  Daily Height in cm: 165.1 (16 Dec 2019 16:07)    Daily   Vital Signs Last 24 Hrs  T(C): 36.6 (17 Dec 2019 14:38), Max: 37 (17 Dec 2019 04:32)  T(F): 97.8 (17 Dec 2019 14:38), Max: 98.6 (17 Dec 2019 04:32)  HR: 84 (17 Dec 2019 14:38) (78 - 96)  BP: 118/68 (17 Dec 2019 14:38) (116/80 - 128/77)  BP(mean): --  RR: 18 (17 Dec 2019 14:38) (17 - 19)  SpO2: 98% (17 Dec 2019 14:38) (97% - 99%)      Drug Dosing Weight  Height (cm): 165.1 (16 Dec 2019 16:07)  Weight (kg): 91.1 (16 Dec 2019 16:07)  BMI (kg/m2): 33.4 (16 Dec 2019 16:07)  BSA (m2): 1.98 (16 Dec 2019 16:07)    Constitutional: NAD     Neck:  No JVD    Respiratory: CTAB/L    Cardiovascular: S1 and S2    Gastrointestinal: BS+, soft, NT/ND    Extremities: No peripheral edema    Neurological: A/O x 3    : No Bella    Skin: No rashes    Comments:      The patient is a suitable candidate for the planned procedure unless box checked [ ]  No, explain:

## 2019-12-17 NOTE — PROGRESS NOTE ADULT - PROBLEM SELECTOR PLAN 1
persistent hematochezia  npo, ivf, ppi, s/p bowel prep for planned colonoscopy today  monitor h/h and clinical status closely   adjust per consultants

## 2019-12-17 NOTE — CONSULT NOTE ADULT - SUBJECTIVE AND OBJECTIVE BOX
CHIEF COMPLAINT:Patient is a 64y old  Female who presents with a chief complaint of BRBPR (16 Dec 2019 15:43)      HISTORY OF PRESENT ILLNESS:HPI:  65 y/o F h/o HTN on [id number], presenting with several episodes of BRBPR today.  3 episodes. 1st episode mixed with stool, next 2 all blood.  small amounts of clots.  No nausea/vomiting.  mild cramping in RLQ upon arrival to Ed.  No fever/chills.  Had a colonscopy (screening) ~8 y/ago; had a number of polyps removed, no cancer per patient; no prior h/o GI bleeding.     at present in ED : she has 2 more episode of BRBPR , denies any abd pain , Her H/H is droping   will order CT angie of abd/pelvis (15 Dec 2019 22:09)      PAST MEDICAL & SURGICAL HISTORY:  HTN (hypertension)  No significant past surgical history          MEDICATIONS:          pantoprazole  Injectable 40 milliGRAM(s) IV Push two times a day      dextrose 5% + sodium chloride 0.9%. 1000 milliLiter(s) IV Continuous <Continuous>      FAMILY HISTORY:      Non-contributory    SOCIAL HISTORY:    not a smoker    Allergies    No Known Allergies    Intolerances    	    REVIEW OF SYSTEMS:  CONSTITUTIONAL: No fever  EYES: No eye pain, visual disturbances, or discharge  ENMT:  No difficulty hearing, tinnitus  NECK: No pain or stiffness  RESPIRATORY: No cough, wheezing,  CARDIOVASCULAR: No chest pain, palpitations, passing out, dizziness, or leg swelling  GASTROINTESTINAL:  See HPI  GENITOURINARY: No dysuria, hematuria  NEUROLOGICAL: No stroke like symptoms  SKIN: No burning or lesions   ENDOCRINE: No heat or cold intolerance  MUSCULOSKELETAL: No joint pain or swelling  PSYCHIATRIC: No  anxiety, mood swings  HEME/LYMPH: No bleeding gums  ALLERGY AND IMMUNOLOGIC: No hives or eczema	    All other ROS negative    PHYSICAL EXAM:  T(C): 36.8 (12-16-19 @ 20:55), Max: 36.9 (12-16-19 @ 06:33)  HR: 93 (12-16-19 @ 20:55) (66 - 96)  BP: 123/76 (12-16-19 @ 20:55) (82/55 - 129/81)  RR: 19 (12-16-19 @ 20:55) (16 - 20)  SpO2: 99% (12-16-19 @ 20:55) (96% - 99%)  Wt(kg): --  I&O's Summary    16 Dec 2019 07:01  -  16 Dec 2019 22:59  --------------------------------------------------------  IN: 0 mL / OUT: 0 mL / NET: 0 mL        Appearance: Normal	  HEENT:   Normal oral mucosa, EOMI	  Cardiovascular: Normal S1 S2, No JVD, No murmurs  Respiratory: Lungs clear to auscultation	  Psychiatry: Alert  Gastrointestinal:  Soft, Non-tender, + BS	  Skin: No rashes   Neurologic: Non-focal  Extremities:  No edema  Vascular: Peripheral pulses palpable    	    	  	  CARDIAC MARKERS:  Labs personally reviewed by me                                  10.8   8.07  )-----------( 240      ( 16 Dec 2019 21:18 )             34.0     12-16    144  |  111<H>  |  15  ----------------------------<  119<H>  4.3   |  21<L>  |  0.90    Ca    9.7      16 Dec 2019 05:57    TPro  6.4  /  Alb  3.5  /  TBili  0.5  /  DBili  x   /  AST  18  /  ALT  14  /  AlkPhos  60  12-16          EKG: Personally reviewed by me - nsr nonspecific st changes      Assessment and Plan:   Problem/Plan - 1:  ·  Problem: GI bleed.  Plan: several episodes of BRBPR   - plan for EGD in AM  - prep tomight    Problem/Plan - 2:  ·  Problem: HTN (hypertension).  Plan: hold of on BP meds for now in presence of GI bleed   -c/w IV fluids  monitor vitals and adjust rx prn.     Problem/Plan - 3:  ·  Problem: Preop risk stratification.  Plan: She is optimized from CV standpoint to proceed at acceptable risk    Problem/Plan - 4:  ·  Problem: JONATHAN (acute kidney injury).  Plan: on admission, resolved.     Advanced care planning was discussed with patient and family.  Advanced care planning  were reviewed and discussed.  Differential diagnosis and plan of care discussed with patient after the evaluation.   Counseling on Diet, exercise, and medication compliance was done.             Pepe Castañeda DO [facility]  Cardiovascular Medicine  [address], [address]  Office [phone]  Cell [phone]
Patient is a 64y Female     Patient is a 64y old  Female who presents with a chief complaint of BRBPR (15 Dec 2019 22:09)      HPI:  63 y/o F h/o HTN on [id number], presenting with several episodes of BRBPR today.  3 episodes. 1st episode mixed with stool, next 2 all blood.  small amounts of clots.  No nausea/vomiting.  mild cramping in RLQ upon arrival to Ed.  No fever/chills.  Had a colonscopy (screening) ~8 y/ago; had a number of polyps removed, no cancer per patient; no prior h/o GI bleeding.     at present in ED : she has 2 more episode of BRBPR , denies any abd pain , Her H/H is droping   will order CT angie of abd/pelvis (15 Dec 2019 22:09)      PAST MEDICAL & SURGICAL HISTORY:  HTN (hypertension)  No significant past surgical history      MEDICATIONS  (STANDING):  dextrose 5% + sodium chloride 0.9%. 1000 milliLiter(s) (100 mL/Hr) IV Continuous <Continuous>  pantoprazole  Injectable 40 milliGRAM(s) IV Push two times a day      Allergies    No Known Allergies    Intolerances        SOCIAL HISTORY:  Denies ETOh,Smoking,     FAMILY HISTORY:      REVIEW OF SYSTEMS:    CONSTITUTIONAL: No weakness, fevers or chills  EYES/ENT: No visual changes;  No vertigo or throat pain   NECK: No pain or stiffness  RESPIRATORY: No cough, wheezing, hemoptysis; No shortness of breath  CARDIOVASCULAR: No chest pain or palpitations  GASTROINTESTINAL: No abdominal or epigastric pain. No nausea, vomiting, or hematemesis; No diarrhea or constipation. No melena or hematochezia.  GENITOURINARY: No dysuria, frequency or hematuria  NEUROLOGICAL: No numbness or weakness  SKIN: No itching, burning, rashes, or lesions   All other review of systems is negative unless indicated above.    VITAL:  T(C): , Max: 36.9 (12-15-19 @ 14:36)  T(F): , Max: 98.4 (12-15-19 @ 14:36)  HR: 73 (12-16-19 @ 06:33)  BP: 129/81 (12-16-19 @ 06:33)  BP(mean): --  RR: 18 (12-16-19 @ 06:33)  SpO2: 99% (12-16-19 @ 06:33)  Wt(kg): --    I and O's:    Height (cm): 165.1 (12-15 @ 14:36)  Weight (kg): 90.3 (12-15 @ 14:36)  BMI (kg/m2): 33.1 (12-15 @ 14:36)  BSA (m2): 1.97 (12-15 @ 14:36)    PHYSICAL EXAM:    Constitutional: NAD  HEENT: PERRLA,   Neck: No JVD  Respiratory: CTA B/L  Cardiovascular: S1 and S2  Gastrointestinal: BS+, soft, NT/ND  Extremities: No peripheral edema  Neurological: A/O x 3, no focal deficits  Psychiatric: Normal mood, normal affect  : No Bella  Skin: No rashes  Access: Not applicable  Back: No CVA tenderness    LABS:                        10.9   10.85 )-----------( 207      ( 16 Dec 2019 00:16 )             34.1     12-16    144  |  111<H>  |  15  ----------------------------<  119<H>  4.3   |  21<L>  |  0.90    Ca    9.7      16 Dec 2019 05:57    TPro  6.4  /  Alb  3.5  /  TBili  0.5  /  DBili  x   /  AST  18  /  ALT  14  /  AlkPhos  60  12-16          RADIOLOGY & ADDITIONAL STUDIES:
Colorectal Surgery Consult  Consulting surgical team: Jonh  Consulting attending: Dr. Harris    HPI:  64F Hx Htn, on ppx [id number] presented with a GI bleed 2 days ago. She was at home in her usual state of health. She ate coleslaw and chicken for lunch (chicken with bones and marrow) and then had to go to the bathroom later. She noticed blood when she was cleaning herself. She then had an episode of bright blood per rectum and decided to come to the hospital. She has not had any dizziness, CP, SOB, abdominal pain, n/v. She's never had anything like this before. She had a colonoscopy '08 that just showed polyps.     She has been admitted to medicine and observed on the floor without any transfusion requirements. She underwent colonoscopy today that just showed blood.       PAST MEDICAL HISTORY:  HTN (hypertension)      PAST SURGICAL HISTORY:  No significant past surgical history      MEDICATIONS:  dextrose 5% + sodium chloride 0.9%. 1000 milliLiter(s) IV Continuous <Continuous>  pantoprazole  Injectable 40 milliGRAM(s) IV Push two times a day      ALLERGIES:  No Known Allergies      VITALS & I/Os:  Vital Signs Last 24 Hrs  T(C): 36.6 (17 Dec 2019 14:38), Max: 37 (17 Dec 2019 04:32)  T(F): 97.8 (17 Dec 2019 14:38), Max: 98.6 (17 Dec 2019 04:32)  HR: 84 (17 Dec 2019 14:38) (78 - 93)  BP: 118/68 (17 Dec 2019 14:38) (118/68 - 128/77)  BP(mean): --  RR: 18 (17 Dec 2019 14:38) (17 - 19)  SpO2: 98% (17 Dec 2019 14:38) (98% - 99%)    I&O's Summary    16 Dec 2019 07:01  -  17 Dec 2019 07:00  --------------------------------------------------------  IN: 1200 mL / OUT: 0 mL / NET: 1200 mL    17 Dec 2019 07:01  -  17 Dec 2019 17:40  --------------------------------------------------------  IN: 0 mL / OUT: 0 mL / NET: 0 mL        PHYSICAL EXAM:  General: No acute distress  Respiratory: Nonlabored  Cardiovascular: RRR  Abdominal: Soft, nondistended, nontender. No rebound or guarding.  Extremities: Warm    LABS:                        8.9    7.64  )-----------( 185      ( 17 Dec 2019 09:22 )             27.1     12-17    147<H>  |  113<H>  |  9   ----------------------------<  95  3.8   |  24  |  0.90    Ca    9.2      17 Dec 2019 06:44    TPro  6.4  /  Alb  3.5  /  TBili  0.5  /  DBili  x   /  AST  18  /  ALT  14  /  AlkPhos  60  12-16    Lactate:                  IMAGING:

## 2019-12-17 NOTE — DISCHARGE NOTE NURSING/CASE MANAGEMENT/SOCIAL WORK - PATIENT PORTAL LINK FT
You can access the FollowMyHealth Patient Portal offered by [facility] by registering at the following website: [email or web address]. By joining [employer]’s FollowMyHealth portal, you will also be able to view your health information using other applications (apps) compatible with our system.

## 2019-12-18 LAB
ANION GAP SERPL CALC-SCNC: 7 MMOL/L — SIGNIFICANT CHANGE UP (ref 5–17)
BASOPHILS # BLD AUTO: 0.02 K/UL — SIGNIFICANT CHANGE UP (ref 0–0.2)
BASOPHILS NFR BLD AUTO: 0.2 % — SIGNIFICANT CHANGE UP (ref 0–2)
BUN SERPL-MCNC: 6 MG/DL — LOW (ref 7–23)
CALCIUM SERPL-MCNC: 9 MG/DL — SIGNIFICANT CHANGE UP (ref 8.4–10.5)
CHLORIDE SERPL-SCNC: 107 MMOL/L — SIGNIFICANT CHANGE UP (ref 96–108)
CO2 SERPL-SCNC: 24 MMOL/L — SIGNIFICANT CHANGE UP (ref 22–31)
CREAT SERPL-MCNC: 0.85 MG/DL — SIGNIFICANT CHANGE UP (ref 0.5–1.3)
EOSINOPHIL # BLD AUTO: 0.19 K/UL — SIGNIFICANT CHANGE UP (ref 0–0.5)
EOSINOPHIL NFR BLD AUTO: 2.2 % — SIGNIFICANT CHANGE UP (ref 0–6)
GLUCOSE SERPL-MCNC: 103 MG/DL — HIGH (ref 70–99)
HCT VFR BLD CALC: 25.8 % — LOW (ref 34.5–45)
HGB BLD-MCNC: 8.4 G/DL — LOW (ref 11.5–15.5)
IMM GRANULOCYTES NFR BLD AUTO: 0.2 % — SIGNIFICANT CHANGE UP (ref 0–1.5)
LYMPHOCYTES # BLD AUTO: 2.55 K/UL — SIGNIFICANT CHANGE UP (ref 1–3.3)
LYMPHOCYTES # BLD AUTO: 29.9 % — SIGNIFICANT CHANGE UP (ref 13–44)
MCHC RBC-ENTMCNC: 25.4 PG — LOW (ref 27–34)
MCHC RBC-ENTMCNC: 32.6 GM/DL — SIGNIFICANT CHANGE UP (ref 32–36)
MCV RBC AUTO: 77.9 FL — LOW (ref 80–100)
MONOCYTES # BLD AUTO: 0.7 K/UL — SIGNIFICANT CHANGE UP (ref 0–0.9)
MONOCYTES NFR BLD AUTO: 8.2 % — SIGNIFICANT CHANGE UP (ref 2–14)
NEUTROPHILS # BLD AUTO: 5.05 K/UL — SIGNIFICANT CHANGE UP (ref 1.8–7.4)
NEUTROPHILS NFR BLD AUTO: 59.3 % — SIGNIFICANT CHANGE UP (ref 43–77)
PLATELET # BLD AUTO: 187 K/UL — SIGNIFICANT CHANGE UP (ref 150–400)
POTASSIUM SERPL-MCNC: 3.4 MMOL/L — LOW (ref 3.5–5.3)
POTASSIUM SERPL-SCNC: 3.4 MMOL/L — LOW (ref 3.5–5.3)
RBC # BLD: 3.31 M/UL — LOW (ref 3.8–5.2)
RBC # FLD: 13.2 % — SIGNIFICANT CHANGE UP (ref 10.3–14.5)
SODIUM SERPL-SCNC: 138 MMOL/L — SIGNIFICANT CHANGE UP (ref 135–145)
WBC # BLD: 8.53 K/UL — SIGNIFICANT CHANGE UP (ref 3.8–10.5)
WBC # FLD AUTO: 8.53 K/UL — SIGNIFICANT CHANGE UP (ref 3.8–10.5)

## 2019-12-18 RX ORDER — POTASSIUM CHLORIDE 20 MEQ
40 PACKET (EA) ORAL ONCE
Refills: 0 | Status: COMPLETED | OUTPATIENT
Start: 2019-12-18 | End: 2019-12-18

## 2019-12-18 RX ORDER — SODIUM CHLORIDE 9 MG/ML
1000 INJECTION, SOLUTION INTRAVENOUS
Refills: 0 | Status: DISCONTINUED | OUTPATIENT
Start: 2019-12-18 | End: 2019-12-19

## 2019-12-18 RX ADMIN — Medication 40 MILLIEQUIVALENT(S): at 12:03

## 2019-12-18 RX ADMIN — PANTOPRAZOLE SODIUM 40 MILLIGRAM(S): 20 TABLET, DELAYED RELEASE ORAL at 05:26

## 2019-12-18 RX ADMIN — PANTOPRAZOLE SODIUM 40 MILLIGRAM(S): 20 TABLET, DELAYED RELEASE ORAL at 17:21

## 2019-12-18 RX ADMIN — SODIUM CHLORIDE 70 MILLILITER(S): 9 INJECTION, SOLUTION INTRAVENOUS at 15:26

## 2019-12-18 NOTE — PROGRESS NOTE ADULT - SUBJECTIVE AND OBJECTIVE BOX
GREEN SURGERY PROGRESS NOTE    HPI:  64F Hx Htn, on ppx [id number] presented with a GI bleed 2 days ago. She was at home in her usual state of health. She ate coleslaw and chicken for lunch (chicken with bones and marrow) and then had to go to the bathroom later. She noticed blood when she was cleaning herself. She then had an episode of bright blood per rectum and decided to come to the hospital. She has not had any dizziness, CP, SOB, abdominal pain, n/v. She's never had anything like this before. She had a colonoscopy '08 that just showed polyps.     She has been admitted to medicine and observed on the floor without any transfusion requirements. She underwent colonoscopy today that just showed blood.       SUBJECTIVE: Pt seen    Pain /SOB/Nausea/Vomiting/Flatus/BM/Void/Ambulation    Vital Signs Last 24 Hrs  T(C): 36.8 (18 Dec 2019 04:29), Max: 37.1 (17 Dec 2019 20:57)  T(F): 98.2 (18 Dec 2019 04:29), Max: 98.8 (17 Dec 2019 20:57)  HR: 79 (18 Dec 2019 04:29) (79 - 98)  BP: 136/75 (18 Dec 2019 04:29) (118/68 - 139/89)  BP(mean): --  RR: 18 (18 Dec 2019 04:29) (17 - 18)  SpO2: 98% (18 Dec 2019 04:29) (96% - 98%)    PHYSICAL EXAM  General: Well appearing, NAD  Neuro: Awake, alert  CHEST: breathing comfortably  CV: appears well perfused  Abdomen: soft, nontender, nondistended, no rebound or guarding  Extremities: Grossly symmetric      I&O's Summary    16 Dec 2019 07:01  -  17 Dec 2019 07:00  --------------------------------------------------------  IN: 1200 mL / OUT: 0 mL / NET: 1200 mL    17 Dec 2019 07:01  -  18 Dec 2019 05:16  --------------------------------------------------------  IN: 200 mL / OUT: 0 mL / NET: 200 mL      I&O's Detail    16 Dec 2019 07:01  -  17 Dec 2019 07:00  --------------------------------------------------------  IN:    dextrose 5% + sodium chloride 0.9%.: 1200 mL  Total IN: 1200 mL    OUT:  Total OUT: 0 mL    Total NET: 1200 mL      17 Dec 2019 07:01  -  18 Dec 2019 05:16  --------------------------------------------------------  IN:    Oral Fluid: 200 mL  Total IN: 200 mL    OUT:  Total OUT: 0 mL    Total NET: 200 mL          MEDICATIONS  (STANDING):  dextrose 5% + sodium chloride 0.9%. 1000 milliLiter(s) (100 mL/Hr) IV Continuous <Continuous>  pantoprazole  Injectable 40 milliGRAM(s) IV Push two times a day    MEDICATIONS  (PRN):      LABS:                        9.5    11.59 )-----------( 221      ( 17 Dec 2019 21:19 )             29.7     12-17    147<H>  |  113<H>  |  9   ----------------------------<  95  3.8   |  24  |  0.90    Ca    9.2      17 Dec 2019 06:44    TPro  6.4  /  Alb  3.5  /  TBili  0.5  /  DBili  x   /  AST  18  /  ALT  14  /  AlkPhos  60  12-16          RADIOLOGY & ADDITIONAL STUDIES:

## 2019-12-18 NOTE — DIETITIAN INITIAL EVALUATION ADULT. - PHYSICAL APPEARANCE
other (specify)/obese/No visual signs of muscle/fat loss noted Ht: 65 inches Wt: 200.8 pounds BMI: 33.4 kg/m2 IBW: 125 (+/-10%) 160 %IBW  No noted edema as per flow sheets.   Skin: no noted pressure injuries as per documentation.

## 2019-12-18 NOTE — PROGRESS NOTE ADULT - SUBJECTIVE AND OBJECTIVE BOX
Patient is a 64y old  Female who presents with a chief complaint of BRBPR (18 Dec 2019 13:31)      INTERVAL HISTORY: feels ok, small blood clot with BM earlier today        PHYSICAL EXAM:  T(C): 37.1 (12-18-19 @ 21:17), Max: 37.1 (12-18-19 @ 21:17)  HR: 92 (12-18-19 @ 21:17) (79 - 92)  BP: 136/81 (12-18-19 @ 21:17) (111/66 - 136/81)  RR: 18 (12-18-19 @ 21:17) (18 - 18)  SpO2: 99% (12-18-19 @ 21:17) (98% - 99%)  Wt(kg): --  I&O's Summary    17 Dec 2019 07:01  -  18 Dec 2019 07:00  --------------------------------------------------------  IN: 1600 mL / OUT: 0 mL / NET: 1600 mL    18 Dec 2019 07:01  -  18 Dec 2019 21:42  --------------------------------------------------------  IN: 240 mL / OUT: 0 mL / NET: 240 mL          Appearance: In no distress	  HEENT:    PERRL, EOMI	  Cardiovascular:  S1 S2, No JVD  Respiratory: Lungs clear to auscultation	  Gastrointestinal:  Soft, Non-tender, + BS	  Extremities:  No edema of LE                                8.4    8.53  )-----------( 187      ( 18 Dec 2019 08:05 )             25.8     12-18    138  |  107  |  6<L>  ----------------------------<  103<H>  3.4<L>   |  24  |  0.85    Ca    9.0      18 Dec 2019 05:45          Labs personally reviewed      Assessment and Plan:   Problem/Plan - 1:  ·  Problem: GI bleed.  Plan: several episodes of BRBPR   - s/p colonoscopy with diverticular bleed  - h/h stable now, no further bleed at this time    Problem/Plan - 2:  ·  Problem: HTN (hypertension).  Plan: hold of on BP meds for now in presence of GI bleed   - BP well controlled    Problem/Plan - 3:  ·  Problem: Preop risk stratification.  Plan: tolerated procedure well with no complications     Problem/Plan - 4:  ·  Problem: JONATHAN (acute kidney injury).  Plan: on admission, resolved.         Pepe Castañeda DO [facility]  Cardiovascular Medicine  [address] [place] [address], [address]  Office: [phone]  Cell: [phone]

## 2019-12-18 NOTE — PROGRESS NOTE ADULT - SUBJECTIVE AND OBJECTIVE BOX
TREVOR [id number],   64yFemale followed for:  No Known Allergies    PAST MEDICAL & SURGICAL HISTORY:  HTN (hypertension)  No significant past surgical history    FAMILY HISTORY:    MEDICATIONS  (STANDING):  dextrose 5% + sodium chloride 0.9%. 1000 milliLiter(s) (100 mL/Hr) IV Continuous <Continuous>  pantoprazole  Injectable 40 milliGRAM(s) IV Push two times a day  potassium chloride    Tablet ER 40 milliEquivalent(s) Oral once    MEDICATIONS  (PRN):      Vital Signs Last 24 Hrs  T(C): 36.8 (18 Dec 2019 04:29), Max: 37.1 (17 Dec 2019 20:57)  T(F): 98.2 (18 Dec 2019 04:29), Max: 98.8 (17 Dec 2019 20:57)  HR: 79 (18 Dec 2019 04:29) (79 - 98)  BP: 136/75 (18 Dec 2019 04:29) (118/68 - 139/89)  BP(mean): --  RR: 18 (18 Dec 2019 04:29) (17 - 18)  SpO2: 98% (18 Dec 2019 04:29) (96% - 98%)  nc/at  s1s2  cta  soft, nt, nd no guarding or rebound  no c/c/e    CBC Full  -  ( 18 Dec 2019 08:05 )  WBC Count : 8.53 K/uL  RBC Count : 3.31 M/uL  Hemoglobin : 8.4 g/dL  Hematocrit : 25.8 %  Platelet Count - Automated : 187 K/uL  Mean Cell Volume : 77.9 fl  Mean Cell Hemoglobin : 25.4 pg  Mean Cell Hemoglobin Concentration : 32.6 gm/dL  Auto Neutrophil # : 5.05 K/uL  Auto Lymphocyte # : 2.55 K/uL  Auto Monocyte # : 0.70 K/uL  Auto Eosinophil # : 0.19 K/uL  Auto Basophil # : 0.02 K/uL  Auto Neutrophil % : 59.3 %  Auto Lymphocyte % : 29.9 %  Auto Monocyte % : 8.2 %  Auto Eosinophil % : 2.2 %  Auto Basophil % : 0.2 %    12-18    138  |  107  |  6<L>  ----------------------------<  103<H>  3.4<L>   |  24  |  0.85    Ca    9.0      18 Dec 2019 05:45

## 2019-12-18 NOTE — DIETITIAN INITIAL EVALUATION ADULT. - DIET TYPE
Clear liquids Recommend advance to low salt diet as medically feasible. Will continue to monitor and adjust as needed. Recommend advance to low salt diet as medically feasible. Will continue to monitor and adjust as needed. Spoke to NP.

## 2019-12-18 NOTE — DIETITIAN INITIAL EVALUATION ADULT. - ENERGY NEEDS
Pertinent information as per chart: Pt 65 y/o with PMH: HTN, admitted with BRBPR, S/P colonoscopy (12/17) - likely diverticulosis.

## 2019-12-18 NOTE — PROGRESS NOTE ADULT - ASSESSMENT
ASSESSMENT:  64F Hx Htn, on ppx [id number] presented with a GI bleed 2 days ago. She has not received any transfusions, she underwent colonoscopy that did not identify a source of bleed.     PLAN:  - The patient is clinically stable without current GI bleed.  - Should she re-bleed, please obtain STAT bleeding scan. - Discussed with floor NP.  - Trend H/H and transfuse PRN.   - Surgery will follow.      Pedro Warner, PGY 2  Green Surgery [phone]

## 2019-12-18 NOTE — DIETITIAN INITIAL EVALUATION ADULT. - OTHER INFO
Pt reports good appetite and PO intake at home; reports following a low salt diet. Confirms NKFA. Reports taking Flaxseed "occasionally" PTA; denies taking any other vitamins/minerals/nutritional supplements.     Pt on clear liquids/NPO x 4 days - with plans to advance diet today (12/18) as per NP. Pt reports tolerating clear liquids. Denies history of difficulty chewing/swallowing. Pt denies nausea, vomiting, diarrhea, or constipation, last BM yesterday (12/17). Denies weight changes PTA, reports UBW between 199 - 205 pounds. Weight as per flow sheets (12/16) 200.8 pounds - weight likely stable, will continue to monitor.     Provided education on diverticulosis nutrition therapy. Encouraged fruits and vegetables consumption, whole grains, and good hydration. Pt amenable for education - made aware RD remains available.

## 2019-12-18 NOTE — PROGRESS NOTE ADULT - ASSESSMENT
likelyl diverticular bleed, no outher source identified.  pt without bleeding overnight.  conservative managmetn. case dw linda kolb md last night, primary gi as outpatient

## 2019-12-18 NOTE — PROGRESS NOTE ADULT - SUBJECTIVE AND OBJECTIVE BOX
Patient seen and examined at bedside  s/p colonoscopy, poor prep however (+) diverticulosis and blood throughout colon   pt endorses 1 episode of brbpr on stool this AM  Case discussed with medical team    HPI:  65 y/o F h/o HTN on [id number], presenting with several episodes of BRBPR today.  3 episodes. 1st episode mixed with stool, next 2 all blood.  small amounts of clots.  No nausea/vomiting.  mild cramping in RLQ upon arrival to Ed.  No fever/chills.  Had a colonscopy (screening) ~8 y/ago; had a number of polyps removed, no cancer per patient; no prior h/o GI bleeding.     at present in ED : she has 2 more episode of BRBPR , denies any abd pain , Her H/H is droping   will order CT angie of abd/pelvis (15 Dec 2019 22:09)      PAST MEDICAL & SURGICAL HISTORY:  HTN (hypertension)  No significant past surgical history      No Known Allergies       MEDICATIONS  (STANDING):  dextrose 5% + sodium chloride 0.9%. 1000 milliLiter(s) (100 mL/Hr) IV Continuous <Continuous>  pantoprazole  Injectable 40 milliGRAM(s) IV Push two times a day    MEDICATIONS  (PRN):      REVIEW OF SYSTEMS:  CONSTITUTIONAL: (+) malaise.   EYES: No acute change in vision   ENT:  No tinnitus  NECK: No stiffness  RESPIRATORY: No hemoptysis  CARDIOVASCULAR: No chest pain, palpitations, syncope  GASTROINTESTINAL: s/p colonoscopy, poor prep however (+) diverticulosis and blood throughout colon. pt endorses 1 episode of brbpr on stool this AM   GENITOURINARY: No hematuria  NEUROLOGICAL: No headaches  LYMPH Nodes: No enlarged glands  ENDOCRINE: No heat or cold intolerance	    T(C): 36.6 (12-18-19 @ 12:24), Max: 37.1 (12-17-19 @ 20:57)  HR: 88 (12-18-19 @ 12:24) (79 - 98)  BP: 111/66 (12-18-19 @ 12:24) (111/66 - 139/89)  RR: 18 (12-18-19 @ 12:24) (17 - 18)  SpO2: 98% (12-18-19 @ 12:24) (96% - 98%)    PHYSICAL EXAMINATION:   Constitutional: WD, NAD  HEENT: NC, AT  Neck:  Supple  Respiratory:  Adequate airflow b/l. Not using accessory muscles of respiration.  Cardiovascular:  S1 & S2 intact, no R/G, 2+ radial pulses b/l  Gastrointestinal: Soft, NT, ND, normoactive b.s., no organomegaly/RT/rigidity  Extremities: WWP  Neurological:  Alert and awake.  No acute focal motor deficits. Crude sensation intact.     Labs and imaging reviewed    LABS:                        8.4    8.53  )-----------( 187      ( 18 Dec 2019 08:05 )             25.8     12-18    138  |  107  |  6<L>  ----------------------------<  103<H>  3.4<L>   |  24  |  0.85    Ca    9.0      18 Dec 2019 05:45              CAPILLARY BLOOD GLUCOSE                  RADIOLOGY & ADDITIONAL STUDIES:

## 2019-12-18 NOTE — PROGRESS NOTE ADULT - PROBLEM SELECTOR PLAN 1
2/2 diverticulosis bleed   s/p colonoscopy, poor prep however (+) diverticulosis and blood throughout colon   pt endorses 1 episode of brbpr on stool this AM  clear liquid diet, advance as tolerated  monitor clinical status and labs closely, maintain hgb > 7.5  adjust per consultants

## 2019-12-18 NOTE — DIETITIAN INITIAL EVALUATION ADULT. - ADD RECOMMEND
1. Will continue to monitor PO intake, weight, labs, skin, GI status, diet. 2. Once applicable, encourage PO intake and obtain food preferences. 3. Provided education on diverticulosis nutrition therapy - pt made aware RD remains available.

## 2019-12-19 ENCOUNTER — TRANSCRIPTION ENCOUNTER (OUTPATIENT)
Age: 64
End: 2019-12-19

## 2019-12-19 VITALS
DIASTOLIC BLOOD PRESSURE: 87 MMHG | HEART RATE: 96 BPM | OXYGEN SATURATION: 100 % | SYSTOLIC BLOOD PRESSURE: 138 MMHG | RESPIRATION RATE: 18 BRPM | TEMPERATURE: 98 F

## 2019-12-19 DIAGNOSIS — D64.9 ANEMIA, UNSPECIFIED: ICD-10-CM

## 2019-12-19 LAB
ANION GAP SERPL CALC-SCNC: 11 MMOL/L — SIGNIFICANT CHANGE UP (ref 5–17)
BUN SERPL-MCNC: 5 MG/DL — LOW (ref 7–23)
CALCIUM SERPL-MCNC: 9.3 MG/DL — SIGNIFICANT CHANGE UP (ref 8.4–10.5)
CHLORIDE SERPL-SCNC: 107 MMOL/L — SIGNIFICANT CHANGE UP (ref 96–108)
CO2 SERPL-SCNC: 24 MMOL/L — SIGNIFICANT CHANGE UP (ref 22–31)
CREAT SERPL-MCNC: 0.88 MG/DL — SIGNIFICANT CHANGE UP (ref 0.5–1.3)
GLUCOSE SERPL-MCNC: 100 MG/DL — HIGH (ref 70–99)
HCT VFR BLD CALC: 26 % — LOW (ref 34.5–45)
HGB BLD-MCNC: 8.5 G/DL — LOW (ref 11.5–15.5)
MAGNESIUM SERPL-MCNC: 1.8 MG/DL — SIGNIFICANT CHANGE UP (ref 1.6–2.6)
MCHC RBC-ENTMCNC: 25.5 PG — LOW (ref 27–34)
MCHC RBC-ENTMCNC: 32.7 GM/DL — SIGNIFICANT CHANGE UP (ref 32–36)
MCV RBC AUTO: 78.1 FL — LOW (ref 80–100)
PLATELET # BLD AUTO: 201 K/UL — SIGNIFICANT CHANGE UP (ref 150–400)
POTASSIUM SERPL-MCNC: 3.7 MMOL/L — SIGNIFICANT CHANGE UP (ref 3.5–5.3)
POTASSIUM SERPL-SCNC: 3.7 MMOL/L — SIGNIFICANT CHANGE UP (ref 3.5–5.3)
RBC # BLD: 3.33 M/UL — LOW (ref 3.8–5.2)
RBC # FLD: 13.3 % — SIGNIFICANT CHANGE UP (ref 10.3–14.5)
SODIUM SERPL-SCNC: 142 MMOL/L — SIGNIFICANT CHANGE UP (ref 135–145)
WBC # BLD: 8.23 K/UL — SIGNIFICANT CHANGE UP (ref 3.8–10.5)
WBC # FLD AUTO: 8.23 K/UL — SIGNIFICANT CHANGE UP (ref 3.8–10.5)

## 2019-12-19 PROCEDURE — 86900 BLOOD TYPING SEROLOGIC ABO: CPT

## 2019-12-19 PROCEDURE — 86803 HEPATITIS C AB TEST: CPT

## 2019-12-19 PROCEDURE — 86901 BLOOD TYPING SEROLOGIC RH(D): CPT

## 2019-12-19 PROCEDURE — 74177 CT ABD & PELVIS W/CONTRAST: CPT

## 2019-12-19 PROCEDURE — 80053 COMPREHEN METABOLIC PANEL: CPT

## 2019-12-19 PROCEDURE — 83735 ASSAY OF MAGNESIUM: CPT

## 2019-12-19 PROCEDURE — 86850 RBC ANTIBODY SCREEN: CPT

## 2019-12-19 PROCEDURE — 80048 BASIC METABOLIC PNL TOTAL CA: CPT

## 2019-12-19 PROCEDURE — 85610 PROTHROMBIN TIME: CPT

## 2019-12-19 PROCEDURE — 85730 THROMBOPLASTIN TIME PARTIAL: CPT

## 2019-12-19 PROCEDURE — 83605 ASSAY OF LACTIC ACID: CPT

## 2019-12-19 PROCEDURE — 85027 COMPLETE CBC AUTOMATED: CPT

## 2019-12-19 PROCEDURE — 99285 EMERGENCY DEPT VISIT HI MDM: CPT

## 2019-12-19 RX ORDER — MEFLOQUINE HYDROCHLORIDE 250 MG/1
0 TABLET ORAL
Qty: 0 | Refills: 0 | DISCHARGE

## 2019-12-19 RX ORDER — PANTOPRAZOLE SODIUM 20 MG/1
40 TABLET, DELAYED RELEASE ORAL
Refills: 0 | Status: DISCONTINUED | OUTPATIENT
Start: 2019-12-19 | End: 2019-12-19

## 2019-12-19 RX ORDER — VALSARTAN 80 MG/1
1 TABLET ORAL
Qty: 0 | Refills: 0 | DISCHARGE

## 2019-12-19 RX ORDER — PANTOPRAZOLE SODIUM 20 MG/1
1 TABLET, DELAYED RELEASE ORAL
Qty: 30 | Refills: 0
Start: 2019-12-19 | End: 2020-01-17

## 2019-12-19 RX ADMIN — PANTOPRAZOLE SODIUM 40 MILLIGRAM(S): 20 TABLET, DELAYED RELEASE ORAL at 05:12

## 2019-12-19 NOTE — DISCHARGE NOTE PROVIDER - NSDCFUADDINST_GEN_ALL_CORE_FT
Take your medications as directed   Follow up with Dr. Mistry  - GI out-pt MD Take your medications as directed   Follow up with Dr. Mistry  - ROSSANA out-pt MD  Call day after discharge to make an appt.  Call day after discharge to make an appt. with  your PMD and d/w home meds   Plan to be f/u within 7-10 days

## 2019-12-19 NOTE — PROGRESS NOTE ADULT - ATTENDING COMMENTS
I have seen and examined the patient. I agree with the above surgery resident's note.
IM ATTENDING COVERING 

## 2019-12-19 NOTE — DISCHARGE NOTE PROVIDER - NSDCCPCAREPLAN_GEN_ALL_CORE_FT
PRINCIPAL DISCHARGE DIAGNOSIS  Diagnosis: GI bleed  Assessment and Plan of Treatment: GI bleed - 2/2 diverticulosis bleed s/p colonoscopy  (poor prep however (+) diverticulosis and blood throughout colon)   s/p procedure clear liquid diet and advanced and tolerated as per GI   labs trended and stable (hgb > 7.5)  will f/u with GI   - Dr. Landin      SECONDARY DISCHARGE DIAGNOSES  Diagnosis: HTN (hypertension)  Assessment and Plan of Treatment: HTN (hypertension)  Vital signs trended and stable   Restarted antihypertensive meds and stable    WIll f/u as an out-pt    Diagnosis: Anemia  Assessment and Plan of Treatment: Labs trended and stable   Followed by GI    Diagnosis: JONATHAN (acute kidney injury)  Assessment and Plan of Treatment: JONATHAN (acute kidney injury)  Labs trended and stable   Followup as an out-pt

## 2019-12-19 NOTE — PROGRESS NOTE ADULT - PROBLEM SELECTOR PLAN 1
2/2 diverticulosis bleed   clinically improved, tolerating diet  pt is medically cleared and optimized for safe discharge  s/p colonoscopy, poor prep however (+) diverticulosis and blood throughout colon   pt endorses 1 episode of brbpr on stool this AM

## 2019-12-19 NOTE — PROGRESS NOTE ADULT - SUBJECTIVE AND OBJECTIVE BOX
TREVOR LUX:[id number],   64yFemale followed for:  No Known Allergies    PAST MEDICAL & SURGICAL HISTORY:  HTN (hypertension)  No significant past surgical history    FAMILY HISTORY:    MEDICATIONS  (STANDING):  dextrose 5% + sodium chloride 0.9%. 1000 milliLiter(s) (70 mL/Hr) IV Continuous <Continuous>  pantoprazole  Injectable 40 milliGRAM(s) IV Push two times a day    MEDICATIONS  (PRN):      Vital Signs Last 24 Hrs  T(C): 36.8 (19 Dec 2019 04:27), Max: 37.1 (18 Dec 2019 21:17)  T(F): 98.2 (19 Dec 2019 04:27), Max: 98.7 (18 Dec 2019 21:17)  HR: 88 (19 Dec 2019 04:27) (88 - 92)  BP: 123/51 (19 Dec 2019 04:27) (111/66 - 136/81)  BP(mean): --  RR: 18 (19 Dec 2019 04:27) (18 - 18)  SpO2: 99% (19 Dec 2019 04:27) (98% - 99%)  nc/at  s1s2  cta  soft, nt, nd no guarding or rebound  no c/c/e    CBC Full  -  ( 19 Dec 2019 08:05 )  WBC Count : 8.23 K/uL  RBC Count : 3.33 M/uL  Hemoglobin : 8.5 g/dL  Hematocrit : 26.0 %  Platelet Count - Automated : 201 K/uL  Mean Cell Volume : 78.1 fl  Mean Cell Hemoglobin : 25.5 pg  Mean Cell Hemoglobin Concentration : 32.7 gm/dL  Auto Neutrophil # : x  Auto Lymphocyte # : x  Auto Monocyte # : x  Auto Eosinophil # : x  Auto Basophil # : x  Auto Neutrophil % : x  Auto Lymphocyte % : x  Auto Monocyte % : x  Auto Eosinophil % : x  Auto Basophil % : x    12-19    142  |  107  |  5<L>  ----------------------------<  100<H>  3.7   |  24  |  0.88    Ca    9.3      19 Dec 2019 06:43  Mg     1.8     12-19

## 2019-12-19 NOTE — DISCHARGE NOTE PROVIDER - PROVIDER TOKENS
PROVIDER:[TOKEN:[[id number]]],PROVIDER:[TOKEN:[[id number]]],PROVIDER:[TOKEN:[[id number]]]

## 2019-12-19 NOTE — PROGRESS NOTE ADULT - SUBJECTIVE AND OBJECTIVE BOX
Patient seen and examined at bedside  No acute events noted overnight  Case discussed with medical team    HPI:  63 y/o F h/o HTN on [id number], presenting with several episodes of BRBPR today.  3 episodes. 1st episode mixed with stool, next 2 all blood.  small amounts of clots.  No nausea/vomiting.  mild cramping in RLQ upon arrival to Ed.  No fever/chills.  Had a colonscopy (screening) ~8 y/ago; had a number of polyps removed, no cancer per patient; no prior h/o GI bleeding.     at present in ED : she has 2 more episode of BRBPR , denies any abd pain , Her H/H is droping   will order CT angie of abd/pelvis (15 Dec 2019 22:09)      PAST MEDICAL & SURGICAL HISTORY:  HTN (hypertension)  No significant past surgical history      No Known Allergies       MEDICATIONS  (STANDING):  pantoprazole    Tablet 40 milliGRAM(s) Oral before breakfast    MEDICATIONS  (PRN):      REVIEW OF SYSTEMS:  CONSTITUTIONAL: (+) malaise.   EYES: No acute change in vision   ENT:  No tinnitus  NECK: No stiffness  RESPIRATORY: No hemoptysis  CARDIOVASCULAR: No chest pain, palpitations, syncope  GASTROINTESTINAL: No hematemesis, diarrhea, melena, or hematochezia.  GENITOURINARY: No hematuria  NEUROLOGICAL: No headaches  LYMPH Nodes: No enlarged glands  ENDOCRINE: No heat or cold intolerance	    T(C): 36.8 (12-19-19 @ 04:27), Max: 37.1 (12-18-19 @ 21:17)  HR: 88 (12-19-19 @ 04:27) (88 - 92)  BP: 123/51 (12-19-19 @ 04:27) (111/66 - 136/81)  RR: 18 (12-19-19 @ 04:27) (18 - 18)  SpO2: 99% (12-19-19 @ 04:27) (98% - 99%)    PHYSICAL EXAMINATION:   Constitutional: WD, NAD  HEENT: NC, AT  Neck:  Supple  Respiratory:  Adequate airflow b/l. Not using accessory muscles of respiration.  Cardiovascular:  S1 & S2 intact, no R/G, 2+ radial pulses b/l  Gastrointestinal: Soft, NT, ND, normoactive b.s., no organomegaly/RT/rigidity  Extremities: WWP  Neurological:  Alert and awake.  No acute focal motor deficits. Crude sensation intact.     Labs and imaging reviewed    LABS:                        8.5    8.23  )-----------( 201      ( 19 Dec 2019 08:05 )             26.0     12-19    142  |  107  |  5<L>  ----------------------------<  100<H>  3.7   |  24  |  0.88    Ca    9.3      19 Dec 2019 06:43  Mg     1.8     12-19              CAPILLARY BLOOD GLUCOSE                  RADIOLOGY & ADDITIONAL STUDIES:

## 2019-12-19 NOTE — DISCHARGE NOTE PROVIDER - HOSPITAL COURSE
This is a 63 y/o F h/o HTN on [id number], presents on 12/15 with BRBPR for several episodes (1st episode mixed with stool), following  2 which were all blood w/ small amounts of clots.  Pt. had no nausea/vomiting.  Pt. had a colonoscopy (screening) ~8 y/ago; had a number of polyps removed, no cancer per patient; with no prior h/o GI bleeding. In ED - + 2 more episode of BRBPR , H/H monitored      and CT angie of abd/pelvis (15 Dec 2019 22:09).        GI bleed - 2/2 diverticulosis bleed s/p colonoscopy, poor prep however (+) diverticulosis and blood throughout colon, s/p procedure clear liquid diet, and advanced and tolerated (hgb > 7.5)        HTN (hypertension).Restarted antihypertensive and stable  / monitor vitals         Anemia -  c/w tx of underlying gi bleed / monitored h/h and hgb > 7.5.         JONATHAN (acute kidney injury)  - labs trended and stable        On 12/19 - discussed discharge with Dr. Beth (covering Dr. Lambert)        Cleared by Dr. Landin for d/c and pt. sent home [facility] dischagre instructoions This is a 63 y/o F h/o HTN on [id number], presents on 12/15 with BRBPR for several episodes (1st episode mixed with stool), following  2 which were all blood w/ small amounts of clots.  Pt. had no nausea/vomiting.  Pt. had a colonoscopy (screening) ~8 y/ago; had a number of polyps removed, no cancer per patient; with no prior h/o GI bleeding. In ED - + 2 more episode of BRBPR , H/H monitored      and CT abd/pelvis with no evidence of gi bleed (15 Dec 2019 22:09).        GI bleed - 2/2 diverticulosis bleed s/p colonoscopy, poor prep however (+) diverticulosis and blood throughout colon, s/p procedure clear liquid diet, and advanced and tolerated (hgb > 7.5)        HTN (hypertension).Restarted antihypertensive and stable  / monitor vitals         Anemia -  c/w tx of underlying gi bleed / monitored h/h and hgb > 7.5.         JONATHAN (acute kidney injury)  - labs trended and stable        On 12/19 - discussed discharge with Dr. Beth (covering Dr. Lambert)        Cleared by Dr. Landin for d/c and pt. sent home [facility] dischagre instructoions This is a 63 y/o F h/o HTN on [id number], presents on 12/15 with BRBPR for several episodes (1st episode mixed with stool), following  2 which were all blood w/ small amounts of clots.  Pt. had no nausea/vomiting.  Pt. had a colonoscopy (screening) ~8 y/ago; had a number of polyps removed, no cancer per patient; with no prior h/o GI bleeding. In ED - + 2 more episode of BRBPR , H/H monitored      and CT abd/pelvis with no evidence of gi bleed (15 Dec 2019 22:09).        GI bleed - 2/2 diverticulosis bleed s/p colonoscopy, poor prep however (+) diverticulosis and blood throughout colon, s/p procedure clear liquid diet, and advanced and tolerated (hgb > 7.5)        HTN (hypertension).Restarted antihypertensive meds when stable vitals         Anemia -  c/w tx of underlying gi bleed / monitored h/h and hgb > 7.5.         JONATHAN (acute kidney injury)  - labs trended and stable        On 12/19 - discussed discharge with Dr. Beth (covering Dr. Lambert)        Cleared for d/c per Dr. Landin and pt. sent home with discharge instructions after d/w Dr. Beth

## 2019-12-19 NOTE — DISCHARGE NOTE PROVIDER - CARE PROVIDER_API CALL
Giovanni Vanegas)  Internal Medicine  [address] [place] [address]  Phone: [phone]  Fax: [phone]  Follow Up Time:     Ramakrishna Landin (DO)  Gastroenterology; Internal Medicine  [address] [facility] [address]  [place] [address]  Phone: [phone]  Fax: [phone]  Follow Up Time:     Jose C Mistry)  Gastroenterology  [address] [place] [address]  Phone: [phone]  Fax: [phone]  Follow Up Time:

## 2019-12-19 NOTE — PROGRESS NOTE ADULT - ASSESSMENT
ASSESSMENT:  64F Hx Htn, on ppx [id number] presented with a GI bleed 2 days ago. She has not received any transfusions, she underwent colonoscopy that did not identify a source of bleed.     PLAN:  - The patient is clinically stable without current GI bleed.  - Should she re-bleed, please obtain STAT bleeding scan. - Discussed with floor NP.  - No surgical intervention at this time    Pedro Warner, PGY 2  [facility] [phone] ASSESSMENT:  64F Hx Htn, on ppx ASA81 presented with a GI bleed 2 days ago. She has not received any transfusions, she underwent colonoscopy that did not identify a source of bleed.     PLAN:  - The patient is clinically stable without current GI bleed.  - Should she re-bleed, please obtain STAT bleeding scan. - Discussed with floor NP.  - No surgical intervention at this time  - Surgery will sign off, please call with any questions    Pedro Warner, PGY 2  [facility] [phone]

## 2019-12-19 NOTE — PROGRESS NOTE ADULT - SUBJECTIVE AND OBJECTIVE BOX
[facility] PROGRESS NOTE    HPI:  64F Hx Htn, on ppx [id number] presented with a GI bleed 2 days ago. She was at home in her usual state of health. She ate coleslaw and chicken for lunch (chicken with bones and marrow) and then had to go to the bathroom later. She noticed blood when she was cleaning herself. She then had an episode of bright blood per rectum and decided to come to the hospital. She has not had any dizziness, CP, SOB, abdominal pain, n/v. She's never had anything like this before. She had a colonoscopy '08 that just showed polyps.     She has been admitted to medicine and observed on the floor without any transfusion requirements. She underwent colonoscopy today that just showed blood.       SUBJECTIVE: Pt seen and examined, NAD. States no BPR overnight, last episode was small clot noticed on BM yesterday morning. Tolerating diet with no abdominal pain, nausea or vomiting.       Vital Signs Last 24 Hrs  T(C): 36.8 (19 Dec 2019 04:27), Max: 37.1 (18 Dec 2019 21:17)  T(F): 98.2 (19 Dec 2019 04:27), Max: 98.7 (18 Dec 2019 21:17)  HR: 88 (19 Dec 2019 04:27) (88 - 92)  BP: 123/51 (19 Dec 2019 04:27) (111/66 - 136/81)  BP(mean): --  RR: 18 (19 Dec 2019 04:27) (18 - 18)  SpO2: 99% (19 Dec 2019 04:27) (98% - 99%)    PHYSICAL EXAM  General: Well appearing, NAD  Neuro: Awake, alert  CHEST: breathing comfortably  CV: appears well perfused  Abdomen: soft, nontender, nondistended, no rebound or guarding  Extremities: Grossly symmetric    I&O's Summary    17 Dec 2019 07:01  -  18 Dec 2019 07:00  --------------------------------------------------------  IN: 1600 mL / OUT: 0 mL / NET: 1600 mL    18 Dec 2019 07:01  -  19 Dec 2019 05:51  --------------------------------------------------------  IN: 790 mL / OUT: 0 mL / NET: 790 mL      I&O's Detail    17 Dec 2019 07:01  -  18 Dec 2019 07:00  --------------------------------------------------------  IN:    dextrose 5% + sodium chloride 0.9%: 1200 mL    Oral Fluid: 400 mL  Total IN: 1600 mL    OUT:  Total OUT: 0 mL    Total NET: 1600 mL      18 Dec 2019 07:01  -  19 Dec 2019 05:51  --------------------------------------------------------  IN:    dextrose 5% + sodium chloride 0.9%.: 300 mL    Oral Fluid: 490 mL  Total IN: 790 mL    OUT:  Total OUT: 0 mL    Total NET: 790 mL          MEDICATIONS  (STANDING):  dextrose 5% + sodium chloride 0.9%. 1000 milliLiter(s) (70 mL/Hr) IV Continuous <Continuous>  pantoprazole  Injectable 40 milliGRAM(s) IV Push two times a day    MEDICATIONS  (PRN):      LABS:                        8.4    8.53  )-----------( 187      ( 18 Dec 2019 08:05 )             25.8     12-18    138  |  107  |  6<L>  ----------------------------<  103<H>  3.4<L>   |  24  |  0.85    Ca    9.0      18 Dec 2019 05:45            RADIOLOGY & ADDITIONAL STUDIES:

## 2019-12-19 NOTE — PROGRESS NOTE ADULT - SUBJECTIVE AND OBJECTIVE BOX
Patient is a 64y old  Female who presents with a chief complaint of BRBPR (19 Dec 2019 11:23)      INTERVAL HISTORY: feels better         PHYSICAL EXAM:  T(C): 36.4 (12-19-19 @ 12:41), Max: 37.1 (12-18-19 @ 21:17)  HR: 96 (12-19-19 @ 12:41) (88 - 96)  BP: 138/87 (12-19-19 @ 12:41) (123/51 - 138/87)  RR: 18 (12-19-19 @ 12:41) (18 - 18)  SpO2: 100% (12-19-19 @ 12:41) (99% - 100%)  Wt(kg): --  I&O's Summary    18 Dec 2019 07:01  -  19 Dec 2019 07:00  --------------------------------------------------------  IN: 1500 mL / OUT: 0 mL / NET: 1500 mL    19 Dec 2019 07:01  -  19 Dec 2019 18:50  --------------------------------------------------------  IN: 240 mL / OUT: 0 mL / NET: 240 mL          Appearance: In no distress	  HEENT:    PERRL, EOMI	  Cardiovascular:  S1 S2, No JVD  Respiratory: Lungs clear to auscultation	  Gastrointestinal:  Soft, Non-tender, + BS	  Extremities:  No edema of LE                                8.5    8.23  )-----------( 201      ( 19 Dec 2019 08:05 )             26.0     12-19    142  |  107  |  5<L>  ----------------------------<  100<H>  3.7   |  24  |  0.88    Ca    9.3      19 Dec 2019 06:43  Mg     1.8     12-19          Labs personally reviewed      Assessment and Plan:   Problem/Plan - 1:  ·  Problem: GI bleed.  Plan: several episodes of BRBPR   - s/p colonoscopy with diverticular bleed  - h/h stable now, no further bleed at this time    Problem/Plan - 2:  ·  Problem: HTN (hypertension).  Plan: hold of on BP meds for now in presence of GI bleed   - BP well controlled    Problem/Plan - 3:  ·  Problem: Preop risk stratification.  Plan: tolerated procedure well with no complications     Problem/Plan - 4:  ·  Problem: JONATHAN (acute kidney injury).  Plan: on admission, resolved.           Pepe Castañeda DO [facility]  Cardiovascular Medicine  [address] [place] [address], [address]  Office: [phone]  Cell: [phone]

## 2019-12-19 NOTE — DISCHARGE NOTE PROVIDER - NSDCMRMEDTOKEN_GEN_ALL_CORE_FT
aspirin 81 mg oral tablet: 1 tab(s) orally once a day  Diovan 160 mg oral tablet: 1 tab(s) orally 2 times a day  mefloquine 250 mg oral tablet: 2 tablets stat then 1 tab every sunday after for 6 weeks  Systane ophthalmic solution: 1 drop(s) to each affected eye 4 times a day, As Needed  To whom it may concern, Patient is currently admitted to the hospital and receiving care.: 1 application buccal once a day MDD:1 pantoprazole 40 mg oral delayed release tablet: 1 tab(s) orally once a day (before a meal)  Systane ophthalmic solution: 1 drop(s) to each affected eye 4 times a day, As Needed

## 2020-03-02 ENCOUNTER — APPOINTMENT (OUTPATIENT)
Dept: MAMMOGRAPHY | Facility: IMAGING CENTER | Age: 65
End: 2020-03-02
Payer: COMMERCIAL

## 2020-03-02 ENCOUNTER — OUTPATIENT (OUTPATIENT)
Dept: OUTPATIENT SERVICES | Facility: HOSPITAL | Age: 65
LOS: 1 days | End: 2020-03-02
Payer: COMMERCIAL

## 2020-03-02 DIAGNOSIS — Z00.8 ENCOUNTER FOR OTHER GENERAL EXAMINATION: ICD-10-CM

## 2020-03-02 PROCEDURE — 77067 SCR MAMMO BI INCL CAD: CPT

## 2020-03-02 PROCEDURE — 77063 BREAST TOMOSYNTHESIS BI: CPT | Mod: 26

## 2020-03-02 PROCEDURE — 77063 BREAST TOMOSYNTHESIS BI: CPT

## 2020-03-02 PROCEDURE — 77067 SCR MAMMO BI INCL CAD: CPT | Mod: 26

## 2020-04-25 ENCOUNTER — MESSAGE (OUTPATIENT)
Age: 65
End: 2020-04-25

## 2020-09-30 ENCOUNTER — APPOINTMENT (OUTPATIENT)
Dept: ORTHOPEDIC SURGERY | Facility: CLINIC | Age: 65
End: 2020-09-30
Payer: COMMERCIAL

## 2020-09-30 VITALS
DIASTOLIC BLOOD PRESSURE: 81 MMHG | WEIGHT: 194 LBS | BODY MASS INDEX: 32.32 KG/M2 | SYSTOLIC BLOOD PRESSURE: 130 MMHG | HEART RATE: 78 BPM | HEIGHT: 65 IN

## 2020-09-30 PROCEDURE — 73562 X-RAY EXAM OF KNEE 3: CPT | Mod: 50

## 2020-09-30 PROCEDURE — 99203 OFFICE O/P NEW LOW 30 MIN: CPT

## 2020-09-30 PROCEDURE — 99213 OFFICE O/P EST LOW 20 MIN: CPT

## 2020-09-30 RX ORDER — MELOXICAM 15 MG/1
15 TABLET ORAL
Qty: 30 | Refills: 1 | Status: ACTIVE | COMMUNITY
Start: 2020-09-30 | End: 1900-01-01

## 2021-03-19 ENCOUNTER — OUTPATIENT (OUTPATIENT)
Dept: OUTPATIENT SERVICES | Facility: HOSPITAL | Age: 66
LOS: 1 days | End: 2021-03-19
Payer: COMMERCIAL

## 2021-03-19 ENCOUNTER — APPOINTMENT (OUTPATIENT)
Dept: MAMMOGRAPHY | Facility: IMAGING CENTER | Age: 66
End: 2021-03-19
Payer: COMMERCIAL

## 2021-03-19 ENCOUNTER — APPOINTMENT (OUTPATIENT)
Dept: ULTRASOUND IMAGING | Facility: IMAGING CENTER | Age: 66
End: 2021-03-19
Payer: COMMERCIAL

## 2021-03-19 DIAGNOSIS — Z00.8 ENCOUNTER FOR OTHER GENERAL EXAMINATION: ICD-10-CM

## 2021-03-19 PROCEDURE — 77067 SCR MAMMO BI INCL CAD: CPT

## 2021-03-19 PROCEDURE — 77067 SCR MAMMO BI INCL CAD: CPT | Mod: 26

## 2021-03-19 PROCEDURE — 77063 BREAST TOMOSYNTHESIS BI: CPT

## 2021-03-19 PROCEDURE — 77063 BREAST TOMOSYNTHESIS BI: CPT | Mod: 26

## 2021-04-15 ENCOUNTER — OUTPATIENT (OUTPATIENT)
Dept: OUTPATIENT SERVICES | Facility: HOSPITAL | Age: 66
LOS: 1 days | End: 2021-04-15
Payer: COMMERCIAL

## 2021-04-15 ENCOUNTER — APPOINTMENT (OUTPATIENT)
Dept: RADIOLOGY | Facility: IMAGING CENTER | Age: 66
End: 2021-04-15
Payer: COMMERCIAL

## 2021-04-15 DIAGNOSIS — Z00.8 ENCOUNTER FOR OTHER GENERAL EXAMINATION: ICD-10-CM

## 2021-04-15 PROCEDURE — 73560 X-RAY EXAM OF KNEE 1 OR 2: CPT | Mod: 26,50,59

## 2021-04-15 PROCEDURE — 73560 X-RAY EXAM OF KNEE 1 OR 2: CPT

## 2021-04-28 NOTE — PATIENT PROFILE ADULT - PRIMARY SOURCE OF SUPPORT/COMFORT
[facility] PRIMARY CARE  [address] [address]  [address] [address]  Dept: [phone]  Dept Fax: [phone]  Loc: [phone]    Hernan Birmingham is a 64 y.o. male who presents today for his medical conditions/complaints as noted below. Hernan Birmingham is c/o of Flank Pain (he has a history of kidney stones and thinks he is having a flare up; he was trying to see Dr. Chaka Da Silva at [facility], but they wanted a CT first)        HPI:     HPI   Chief Complaint   Patient presents with    Flank Pain     he has a history of kidney stones and thinks he is having a flare up; he was trying to see Dr. Chaka Da Silva at [facility], but they wanted a CT first     Patient presents today for evaluation of right flank pain. He has history of renal stones; last imaging was done in Dec 2020. He has seen Dr Chaka Da Silva in the past but it has been over 5 years and he needed a new referral. However, they will not see him without an ultrasound. He is leaving for [facility] in 2 weeks and was hoping to do imaging prior to leaving. He states pain is tolerable with use of tylenol. He cannot use nsaids due to anticoagulation.        Past Medical History:   Diagnosis Date    Cervical spondylosis     Chronic gout of right foot 2/13/2019    Chronic headaches     Functional diarrhea 7/7/2017    Gastroesophageal reflux disease without esophagitis 4/11/2018    History of blood clot to lungs during pregnancy     Hypertension     Hypogonadism male 10/10/2017    Liver lesion     Migraine without aura and without status migrainosus, not intractable 10/10/2017    Nephrolithiasis 10/10/2017    Obstructive sleep apnea     Osteoarthritis     Palpitations     Vitamin D deficiency 10/10/2017      Past Surgical History:   Procedure Laterality Date    CHOLECYSTECTOMY      COLONOSCOPY  12/27/2018    Steve:  APx3,  HP,   3y recall    COLONOSCOPY  07/23/2013    Steve:  Diverticulosis sigmoid colon repeat exam in 5 years    HAND SURGERY Right     Ring finger    MAXILLARY SINUSOTOMY      UPPER GASTROINTESTINAL ENDOSCOPY  12/27/2018    Steve;  reflux    UPPER GASTROINTESTINAL ENDOSCOPY  11/20/2014    Steve:  Bile reflux       Vitals 4/28/2021 2/26/2021 2/8/2021 8/3/2020 7/23/2020 2/7/1282   SYSTOLIC 601 686 289 474 - 332   DIASTOLIC 72 64 78 82 - 66   Site - - Left Upper Arm - - -   Position - - - - - -   Pulse 65 69 81 66 67 67   Temp 97.4 - 98.7 98.3 97.4 97.8   Resp 16 - - 20 - 20   SpO2 96 96 97 97 94 97   Weight 274 lb 12.8 oz 274 lb 12.8 oz 279 lb 286 lb - 267 lb   Height 6' 0\" 6' 0\" 6' 0\" 6' 0\" - 6' 0\"   Body mass index 37.27 kg/m2 37.27 kg/m2 37.84 kg/m2 38.78 kg/m2 - 36.21 kg/m2   Some recent data might be hidden       Family History   Problem Relation Age of Onset    Heart Disease Mother     Breast Cancer Mother     High Blood Pressure Mother     Alzheimer's Disease Mother     Heart Disease Father     High Blood Pressure Father     Coronary Art Dis Father     Colon Cancer Neg Hx     Esophageal Cancer Neg Hx     Liver Cancer Neg Hx     Rectal Cancer Neg Hx     Stomach Cancer Neg Hx        Social History     Tobacco Use    Smoking status: Never Smoker    Smokeless tobacco: Never Used   Substance Use Topics    Alcohol use: Yes     Comment: occasional      Current Outpatient Medications   Medication Sig Dispense Refill    azithromycin (ZITHROMAX) 250 MG tablet Take 1 tablet by mouth See Admin Instructions for 5 days 500mg on day 1 followed by 250mg on days 2 - 5 6 tablet 0    methylPREDNISolone (MEDROL DOSEPACK) 4 MG tablet Take by mouth. 1 kit 0    traMADol (ULTRAM) 50 MG tablet Take 1 tablet by mouth 2 times daily for 30 days.  60 tablet 0    azelastine (ASTELIN) 0.1 % nasal spray USE 2 SPRAYS IN EACH NOSTRIL DAILY 30 mL 5    buPROPion (WELLBUTRIN XL) 300 MG extended release tablet TAKE 1 TABLET BY MOUTH EVERY DAY 90 tablet 3    gabapentin (NEURONTIN) 300 MG capsule TAKE 1 CAPSULE BY for you and we are counting on your feedback to help make that happen. Electronically signed by JACOB Franco on 4/28/2021 at 1:13 PM    EMR Dragon/transcription disclaimer:  Much of thisencounter note is electronic transcription/translation of spoken language to printed texts. The electronic translation of spoken language may be erroneous, or at times, nonsensical words or phrases may be inadvertentlytranscribed.   Although I have reviewed the note for such errors, some may still exist. spouse

## 2021-06-09 ENCOUNTER — APPOINTMENT (OUTPATIENT)
Dept: ORTHOPEDIC SURGERY | Facility: CLINIC | Age: 66
End: 2021-06-09
Payer: COMMERCIAL

## 2021-06-09 VITALS
HEIGHT: 66 IN | OXYGEN SATURATION: 98 % | DIASTOLIC BLOOD PRESSURE: 84 MMHG | BODY MASS INDEX: 32.67 KG/M2 | SYSTOLIC BLOOD PRESSURE: 123 MMHG | WEIGHT: 203.26 LBS | HEART RATE: 96 BPM

## 2021-06-09 DIAGNOSIS — M17.0 BILATERAL PRIMARY OSTEOARTHRITIS OF KNEE: ICD-10-CM

## 2021-06-09 DIAGNOSIS — M17.12 UNILATERAL PRIMARY OSTEOARTHRITIS, LEFT KNEE: ICD-10-CM

## 2021-06-09 PROCEDURE — 20611 DRAIN/INJ JOINT/BURSA W/US: CPT | Mod: LT

## 2021-06-09 PROCEDURE — 99214 OFFICE O/P EST MOD 30 MIN: CPT | Mod: 25

## 2021-06-09 PROCEDURE — 99072 ADDL SUPL MATRL&STAF TM PHE: CPT

## 2021-06-09 NOTE — HISTORY OF PRESENT ILLNESS
[Worsening] : worsening [___ yrs] : [unfilled] year(s) ago [8] : a maximum pain level of 8/10 [Intermit.] : ~He/She~ states the symptoms seem to be intermittent [Walking] : worsened by walking [Knee Flexion] : worsened with knee flexion [Recumbency] : relieved by recumbency [de-identified] : Pt presents for initial evaluation with pain in her bilateral knees, worse to the left knee, pt has hx bakers cyst left posterior knee, there is no known injury, there is no clicking or snapping or buckling. Pt has taken Tramadol  prescribed by her PCP for pain with relief. Pt states she performs no exercise with the exception of walking, pt weight is 203 lbs Pt was seen by Dr Baldemar Pierre who ordered xray  of her bilateral knees  on 4/15/2021 Pt had gel injections to her bilateral knees March or April /2021 with no relief. Pt has no hx of gout. [de-identified] : certain movements [de-identified] : medication, lying down

## 2021-06-09 NOTE — PROCEDURE
[de-identified] : Under sterile technique using ultrasound-guided needle placement the left knee was injected with Depo-Medrol with lidocaine.\par \par A discussion took place with the patient regarding the procedure. General risks and benefits were reviewed.\par The suprapatellar region of the knee was prepped to attain a sterile field. Ethyl Chloride spray was used as a topical anesthetic. \par A 22-gauge 1-1/2 inch needle was used to inject the medication.\par The procedure was performed utilizing ultrasound guided needle placement with the Sonosite linear transducer in order to visualize and confirm the location of the needle tip and intra-articular delivery of the medication in the exact location desired to achieve maximal benefit from the medication. The images were recorded and saved.\par The injection site was sterilely dressed and the patient tolerated the procedure well, without complications.\par The patient was given post injection instructions including rest, cool pack applications, and take NSAIDs or acetaminophen. The patient was advised that if there are worsening symptoms or any associated problems, to contact our office accordingly

## 2021-06-09 NOTE — PHYSICAL EXAM
[de-identified] : Physical examination of the left knee discloses a painful range of motion between 10 to 115 degrees.  Mild effusion is present.  Moderate crepitus, varus deformity\par Examination of the right knee discloses less discomfort with range of motion from 0 to 120 degrees.

## 2021-06-09 NOTE — DISCUSSION/SUMMARY
[de-identified] : The patient was informed of her findings.  She is aware that she is a candidate for left-sided total knee arthroplasty given her degree of arthritic involvement with bone-on-bone medial compartment wear.  She underwent last cortisone injection today since she is unable to take any further NSAIDs not having any substantial relief.  The patient is currently taking tramadol; she will be referred to physical therapy as an adjunct modality.

## 2022-03-21 ENCOUNTER — APPOINTMENT (OUTPATIENT)
Dept: RADIOLOGY | Facility: IMAGING CENTER | Age: 67
End: 2022-03-21

## 2022-03-21 ENCOUNTER — APPOINTMENT (OUTPATIENT)
Dept: MAMMOGRAPHY | Facility: IMAGING CENTER | Age: 67
End: 2022-03-21
Payer: MEDICARE

## 2022-03-21 ENCOUNTER — APPOINTMENT (OUTPATIENT)
Dept: ULTRASOUND IMAGING | Facility: IMAGING CENTER | Age: 67
End: 2022-03-21
Payer: MEDICARE

## 2022-03-21 ENCOUNTER — OUTPATIENT (OUTPATIENT)
Dept: OUTPATIENT SERVICES | Facility: HOSPITAL | Age: 67
LOS: 1 days | End: 2022-03-21
Payer: MEDICARE

## 2022-03-21 DIAGNOSIS — Z00.8 ENCOUNTER FOR OTHER GENERAL EXAMINATION: ICD-10-CM

## 2022-03-21 PROCEDURE — 77067 SCR MAMMO BI INCL CAD: CPT | Mod: 26

## 2022-03-21 PROCEDURE — 77063 BREAST TOMOSYNTHESIS BI: CPT

## 2022-03-21 PROCEDURE — 77063 BREAST TOMOSYNTHESIS BI: CPT | Mod: 26

## 2022-03-21 PROCEDURE — 72110 X-RAY EXAM L-2 SPINE 4/>VWS: CPT

## 2022-03-21 PROCEDURE — 72040 X-RAY EXAM NECK SPINE 2-3 VW: CPT | Mod: 26

## 2022-03-21 PROCEDURE — 77067 SCR MAMMO BI INCL CAD: CPT

## 2022-03-21 PROCEDURE — 72040 X-RAY EXAM NECK SPINE 2-3 VW: CPT

## 2022-03-21 PROCEDURE — 72110 X-RAY EXAM L-2 SPINE 4/>VWS: CPT | Mod: 26

## 2022-03-28 NOTE — CONSULT NOTE ADULT - ASSESSMENT
pt with brbpr, ct negative except divertiuclosis.  pt is rn.  last colon 8 years ago. will need colon to rule out unlikely mass or lesion.  r/b/a discussed, will prep Yes

## 2022-07-17 ENCOUNTER — APPOINTMENT (OUTPATIENT)
Dept: MRI IMAGING | Facility: IMAGING CENTER | Age: 67
End: 2022-07-17

## 2022-07-17 ENCOUNTER — OUTPATIENT (OUTPATIENT)
Dept: OUTPATIENT SERVICES | Facility: HOSPITAL | Age: 67
LOS: 1 days | End: 2022-07-17
Payer: MEDICARE

## 2022-07-17 DIAGNOSIS — Z00.8 ENCOUNTER FOR OTHER GENERAL EXAMINATION: ICD-10-CM

## 2022-07-17 PROCEDURE — 72148 MRI LUMBAR SPINE W/O DYE: CPT | Mod: MH

## 2022-07-17 PROCEDURE — 72148 MRI LUMBAR SPINE W/O DYE: CPT | Mod: 26,MH

## 2022-07-17 PROCEDURE — 72141 MRI NECK SPINE W/O DYE: CPT | Mod: MH

## 2022-07-17 PROCEDURE — 72141 MRI NECK SPINE W/O DYE: CPT | Mod: 26,MH

## 2022-08-10 NOTE — ED PROVIDER NOTE - CPE EDP NEURO NORM
PAST MEDICAL HISTORY:  DM (diabetes mellitus)     MS (multiple sclerosis)     Pressure ulcers of skin of multiple topographic sites     
normal...

## 2022-09-16 NOTE — H&P ADULT - NSHPRISKHIVSCREEN_GEN_ALL_CORE
Inform FIT test(This was provided by her insurance)  results are positive. Suggest she see GI.  I can place a referral Offered and patient declined

## 2022-11-03 ENCOUNTER — EMERGENCY (EMERGENCY)
Facility: HOSPITAL | Age: 67
LOS: 1 days | Discharge: HOME | End: 2022-11-03
Attending: EMERGENCY MEDICINE | Admitting: EMERGENCY MEDICINE

## 2022-11-03 VITALS
RESPIRATION RATE: 18 BRPM | SYSTOLIC BLOOD PRESSURE: 177 MMHG | DIASTOLIC BLOOD PRESSURE: 89 MMHG | TEMPERATURE: 98 F | OXYGEN SATURATION: 100 % | HEART RATE: 81 BPM

## 2022-11-03 PROCEDURE — 93010 ELECTROCARDIOGRAM REPORT: CPT | Mod: GC

## 2022-11-03 PROCEDURE — 99285 EMERGENCY DEPT VISIT HI MDM: CPT | Mod: CS,GC

## 2022-11-04 VITALS
DIASTOLIC BLOOD PRESSURE: 74 MMHG | SYSTOLIC BLOOD PRESSURE: 142 MMHG | TEMPERATURE: 98 F | RESPIRATION RATE: 18 BRPM | OXYGEN SATURATION: 97 % | HEART RATE: 70 BPM

## 2022-11-04 LAB
ALBUMIN SERPL ELPH-MCNC: 3.9 G/DL — SIGNIFICANT CHANGE UP (ref 3.3–5)
ALP SERPL-CCNC: 80 U/L — SIGNIFICANT CHANGE UP (ref 40–120)
ALT FLD-CCNC: 14 U/L — SIGNIFICANT CHANGE UP (ref 4–33)
ANION GAP SERPL CALC-SCNC: 9 MMOL/L — SIGNIFICANT CHANGE UP (ref 7–14)
APTT BLD: 31.5 SEC — SIGNIFICANT CHANGE UP (ref 27–36.3)
AST SERPL-CCNC: 27 U/L — SIGNIFICANT CHANGE UP (ref 4–32)
BASOPHILS # BLD AUTO: 0.03 K/UL — SIGNIFICANT CHANGE UP (ref 0–0.2)
BASOPHILS NFR BLD AUTO: 0.3 % — SIGNIFICANT CHANGE UP (ref 0–2)
BILIRUB SERPL-MCNC: 0.3 MG/DL — SIGNIFICANT CHANGE UP (ref 0.2–1.2)
BUN SERPL-MCNC: 20 MG/DL — SIGNIFICANT CHANGE UP (ref 7–23)
CALCIUM SERPL-MCNC: 10.3 MG/DL — SIGNIFICANT CHANGE UP (ref 8.4–10.5)
CHLORIDE SERPL-SCNC: 104 MMOL/L — SIGNIFICANT CHANGE UP (ref 98–107)
CO2 SERPL-SCNC: 24 MMOL/L — SIGNIFICANT CHANGE UP (ref 22–31)
CREAT SERPL-MCNC: 1.05 MG/DL — SIGNIFICANT CHANGE UP (ref 0.5–1.3)
EGFR: 58 ML/MIN/1.73M2 — LOW
EOSINOPHIL # BLD AUTO: 0.3 K/UL — SIGNIFICANT CHANGE UP (ref 0–0.5)
EOSINOPHIL NFR BLD AUTO: 2.7 % — SIGNIFICANT CHANGE UP (ref 0–6)
FLUAV AG NPH QL: SIGNIFICANT CHANGE UP
FLUBV AG NPH QL: SIGNIFICANT CHANGE UP
GLUCOSE SERPL-MCNC: 106 MG/DL — HIGH (ref 70–99)
HCT VFR BLD CALC: 38.4 % — SIGNIFICANT CHANGE UP (ref 34.5–45)
HGB BLD-MCNC: 12.3 G/DL — SIGNIFICANT CHANGE UP (ref 11.5–15.5)
IANC: 6.96 K/UL — SIGNIFICANT CHANGE UP (ref 1.8–7.4)
IMM GRANULOCYTES NFR BLD AUTO: 0.3 % — SIGNIFICANT CHANGE UP (ref 0–0.9)
INR BLD: 1.08 RATIO — SIGNIFICANT CHANGE UP (ref 0.88–1.16)
LYMPHOCYTES # BLD AUTO: 2.76 K/UL — SIGNIFICANT CHANGE UP (ref 1–3.3)
LYMPHOCYTES # BLD AUTO: 24.9 % — SIGNIFICANT CHANGE UP (ref 13–44)
MCHC RBC-ENTMCNC: 24.8 PG — LOW (ref 27–34)
MCHC RBC-ENTMCNC: 32 GM/DL — SIGNIFICANT CHANGE UP (ref 32–36)
MCV RBC AUTO: 77.4 FL — LOW (ref 80–100)
MONOCYTES # BLD AUTO: 1.02 K/UL — HIGH (ref 0–0.9)
MONOCYTES NFR BLD AUTO: 9.2 % — SIGNIFICANT CHANGE UP (ref 2–14)
NEUTROPHILS # BLD AUTO: 6.96 K/UL — SIGNIFICANT CHANGE UP (ref 1.8–7.4)
NEUTROPHILS NFR BLD AUTO: 62.6 % — SIGNIFICANT CHANGE UP (ref 43–77)
NRBC # BLD: 0 /100 WBCS — SIGNIFICANT CHANGE UP (ref 0–0)
NRBC # FLD: 0 K/UL — SIGNIFICANT CHANGE UP (ref 0–0)
PLATELET # BLD AUTO: 271 K/UL — SIGNIFICANT CHANGE UP (ref 150–400)
POTASSIUM SERPL-MCNC: 4.5 MMOL/L — SIGNIFICANT CHANGE UP (ref 3.5–5.3)
POTASSIUM SERPL-SCNC: 4.5 MMOL/L — SIGNIFICANT CHANGE UP (ref 3.5–5.3)
PROT SERPL-MCNC: 7.3 G/DL — SIGNIFICANT CHANGE UP (ref 6–8.3)
PROTHROM AB SERPL-ACNC: 12.5 SEC — SIGNIFICANT CHANGE UP (ref 10.5–13.4)
RBC # BLD: 4.96 M/UL — SIGNIFICANT CHANGE UP (ref 3.8–5.2)
RBC # FLD: 13.9 % — SIGNIFICANT CHANGE UP (ref 10.3–14.5)
RSV RNA NPH QL NAA+NON-PROBE: SIGNIFICANT CHANGE UP
SARS-COV-2 RNA SPEC QL NAA+PROBE: SIGNIFICANT CHANGE UP
SODIUM SERPL-SCNC: 137 MMOL/L — SIGNIFICANT CHANGE UP (ref 135–145)
TROPONIN T, HIGH SENSITIVITY RESULT: <6 NG/L — SIGNIFICANT CHANGE UP
WBC # BLD: 11.1 K/UL — HIGH (ref 3.8–10.5)
WBC # FLD AUTO: 11.1 K/UL — HIGH (ref 3.8–10.5)

## 2022-11-04 PROCEDURE — 71046 X-RAY EXAM CHEST 2 VIEWS: CPT | Mod: 26

## 2022-11-04 RX ORDER — ASPIRIN/CALCIUM CARB/MAGNESIUM 324 MG
162 TABLET ORAL ONCE
Refills: 0 | Status: COMPLETED | OUTPATIENT
Start: 2022-11-04 | End: 2022-11-04

## 2022-11-04 RX ORDER — ACETAMINOPHEN 500 MG
975 TABLET ORAL ONCE
Refills: 0 | Status: COMPLETED | OUTPATIENT
Start: 2022-11-04 | End: 2022-11-04

## 2022-11-04 RX ADMIN — Medication 162 MILLIGRAM(S): at 02:48

## 2022-11-04 NOTE — ED PROVIDER NOTE - MUSCULOSKELETAL, MLM
Spine appears normal, range of motion is not limited, no joint tenderness (+)left upper chest wall ttp, no deformity or skn changes, pain reproducible with LUE movmenet but normal LUE ROM

## 2022-11-04 NOTE — ED PROVIDER NOTE - NSFOLLOWUPINSTRUCTIONS_ED_ALL_ED_FT
You were seen in the ER for your chest pain.    While you were here you had labs, EKG, and a chest x-ray done which were discussed with you.    Continue all regular home medications as prescribed.    Please follow up with your primary care doctor within 3 days.   You were given copies of the labs and imaging results, please take them to your follow up appointments.    Return to the ER for any worsening symptoms or concerns including returning or worsening chest pain, trouble breathing, sweating, weakness, or any other concerns.

## 2022-11-04 NOTE — ED ADULT NURSE NOTE - OBJECTIVE STATEMENT
Patient arrives to the ED for chest pain that started four hours prior to arrival. A&Ox4. Patient reports the pain radiates down her left arm and worsens with left arm movement. Patient denies any nausea, vomiting, SOB, abdominal pain, headache, or dizziness. Patient breathing even and nonlabored. No acute distress. Belly soft, nondistended, and nontender. Cardiac monitor in place- sinus rhythm. 20g IV placed to right arm. Labs sent as ordered. COVID swab sent. Patient medicated as ordered. Safety maintained. Patient stable upon exiting the room.

## 2022-11-04 NOTE — ED PROVIDER NOTE - OBJECTIVE STATEMENT
67 year old female with PMH HTN, GERD presents with left sided aching chest pain radiating down left arm for the past four hours, worse with raising left arm. Denies history of similar symptoms. Non exertional. Denies fever, chills, shortness of breath, diaphoresis, nausea, vomiting, abdominal pain, recent illness, cough, or congestion. Took naproxen for symptoms without relief. No stress/echo in the past. No family history of cardiac disease, nonsmoker, no alc/drug use.     PCP: Nick Sales (and Janet)

## 2022-11-04 NOTE — ED PROVIDER NOTE - ATTENDING CONTRIBUTION TO CARE
66 y/o F with h/o HTN, GERD here with several hours of chest pain.  Pt reports pain to L side of chest and radiating to L shoulder and arm, beginning earlier this evening at rest, worsening with movement of L arm.  No associated fever, chills, back pain, cough, sob, abd pain, n/v, leg pain or swelling.  No personal h/o cardiac dz.  Well appearing, sitting comfortably in stretcher, awake and alert, nontoxic.  AF/VSS.  Lungs cta bl.  Cards nl S1/S2, RRR, no MRG.  Abd soft ntnd.  No pedal edema or calf tenderness.  Plan for ekg, labs incl trop, xr, asa, pain meds - sxs appear to be msk in nature, but given age and risk factors, consider ACS.  No resp sxs to suggest ptx, pna, pe.  Do not suspect aortic catastrophe or booerhaaves.  Pt offered CDU for provocative testing, but declines and prefers to f/u with her outpatient provider for further cardiac eval.

## 2022-11-04 NOTE — ED PROVIDER NOTE - CLINICAL SUMMARY MEDICAL DECISION MAKING FREE TEXT BOX
Fifi Browne DO PGY-2  67 year old female with PMH HTN, GERD presents with left sided aching chest pain radiating down left arm for the past four hours, worse with raising left arm. Denies history of similar symptoms. Took naproxen for symptoms without relief. No stress/echo in the past. Hemodynamically stable, EKG NSR without ischemic changes. Will evaluate for ACS, infection, hematologic/electrolyte abnormalities, possible muscle strain. Will obtain labs, CXR, monitor, and re-evaluate for dispo.

## 2022-11-04 NOTE — ED PROVIDER NOTE - PATIENT PORTAL LINK FT
You can access the FollowMyHealth Patient Portal offered by [facility] by registering at the following website: [email or web address]. By joining [employer]’s FollowMyHealth portal, you will also be able to view your health information using other applications (apps) compatible with our system.

## 2022-11-04 NOTE — ED PROVIDER NOTE - PROGRESS NOTE DETAILS
Fifi Browne DO PGY-2     Discussed with patient option of CDU stay for further work up however patient states preference for discharge home, will follow up with her primary care doctor tomorrow.     I discussed the plan for discharge home with the patient. Instructed the patient to return to the emergency department with any alarming symptoms, any worsening symptoms, or any other concerning symptoms.  I instructed this patient to call their primary doctor today, to inform them of their visit to the emergency department, and to obtain a repeat evaluation from their primary doctor in the next 1-3 days. The patient understands and agrees with this plan for follow up and feels safe returning home.  At the time of discharge this patient remained in stable condition, remained in no acute distress, and their vital signs remained stable measured within normal limits.
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
## 2023-03-06 NOTE — ED ADULT TRIAGE NOTE - BMI (KG/M2)
[facility]   Phone: [phone]   Fax: [phone]        Referring Provider: MD Sid Dacosta [address] [place] [address]     33.1

## 2023-03-22 ENCOUNTER — OUTPATIENT (OUTPATIENT)
Dept: OUTPATIENT SERVICES | Facility: HOSPITAL | Age: 68
LOS: 1 days | End: 2023-03-22
Payer: MEDICARE

## 2023-03-22 ENCOUNTER — APPOINTMENT (OUTPATIENT)
Dept: MAMMOGRAPHY | Facility: IMAGING CENTER | Age: 68
End: 2023-03-22
Payer: MEDICARE

## 2023-03-22 DIAGNOSIS — Z00.8 ENCOUNTER FOR OTHER GENERAL EXAMINATION: ICD-10-CM

## 2023-03-22 PROCEDURE — 77067 SCR MAMMO BI INCL CAD: CPT

## 2023-03-22 PROCEDURE — 77063 BREAST TOMOSYNTHESIS BI: CPT

## 2023-03-22 PROCEDURE — 77067 SCR MAMMO BI INCL CAD: CPT | Mod: 26

## 2023-03-22 PROCEDURE — 77063 BREAST TOMOSYNTHESIS BI: CPT | Mod: 26

## 2023-04-14 NOTE — ED PROVIDER NOTE - CARE PLAN
Please see prior note--this was an error. Rx has been amended to reflect intended dose of 0.25 mg subcutaneous weekly x 4 weeks, then 0.5 mg subcutaneous weekly x 4 weeks.     Thank you,     Dr. SCHMITT    1 Principal Discharge DX:	Chest pain

## 2024-01-27 ENCOUNTER — EMERGENCY (EMERGENCY)
Facility: HOSPITAL | Age: 69
LOS: 1 days | Discharge: HOME | End: 2024-01-27
Admitting: EMERGENCY MEDICINE
Payer: MEDICARE

## 2024-01-27 VITALS
SYSTOLIC BLOOD PRESSURE: 149 MMHG | TEMPERATURE: 99 F | RESPIRATION RATE: 18 BRPM | HEART RATE: 95 BPM | OXYGEN SATURATION: 98 % | DIASTOLIC BLOOD PRESSURE: 92 MMHG

## 2024-01-27 PROCEDURE — 99284 EMERGENCY DEPT VISIT MOD MDM: CPT

## 2024-01-27 RX ORDER — IBUPROFEN 200 MG
600 TABLET ORAL ONCE
Refills: 0 | Status: COMPLETED | OUTPATIENT
Start: 2024-01-27 | End: 2024-01-27

## 2024-01-27 RX ADMIN — Medication 600 MILLIGRAM(S): at 16:21
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
## 2024-01-27 NOTE — ED PROVIDER NOTE - PATIENT PORTAL LINK FT
You can access the FollowMyHealth Patient Portal offered by [facility] by registering at the following website: [email or web address]. By joining [employer]’s FollowMyHealth portal, you will also be able to view your health information using other applications (apps) compatible with our system.

## 2024-05-15 ENCOUNTER — APPOINTMENT (OUTPATIENT)
Dept: MAMMOGRAPHY | Facility: IMAGING CENTER | Age: 69
End: 2024-05-15
Payer: MEDICARE

## 2024-05-15 ENCOUNTER — OUTPATIENT (OUTPATIENT)
Dept: OUTPATIENT SERVICES | Facility: HOSPITAL | Age: 69
LOS: 1 days | End: 2024-05-15
Payer: MEDICARE

## 2024-05-15 DIAGNOSIS — Z00.8 ENCOUNTER FOR OTHER GENERAL EXAMINATION: ICD-10-CM

## 2024-05-15 PROCEDURE — 77063 BREAST TOMOSYNTHESIS BI: CPT

## 2024-05-15 PROCEDURE — 77067 SCR MAMMO BI INCL CAD: CPT

## 2024-05-15 PROCEDURE — 77063 BREAST TOMOSYNTHESIS BI: CPT | Mod: 26

## 2024-05-15 PROCEDURE — 77067 SCR MAMMO BI INCL CAD: CPT | Mod: 26

## 2024-06-07 ENCOUNTER — EMERGENCY (EMERGENCY)
Facility: HOSPITAL | Age: 69
LOS: 1 days | Discharge: HOME | End: 2024-06-07
Attending: EMERGENCY MEDICINE
Payer: MEDICARE

## 2024-06-07 VITALS
DIASTOLIC BLOOD PRESSURE: 60 MMHG | SYSTOLIC BLOOD PRESSURE: 128 MMHG | RESPIRATION RATE: 16 BRPM | HEART RATE: 80 BPM | OXYGEN SATURATION: 98 % | TEMPERATURE: 98 F

## 2024-06-07 VITALS
SYSTOLIC BLOOD PRESSURE: 150 MMHG | WEIGHT: 197.09 LBS | HEIGHT: 64 IN | HEART RATE: 76 BPM | TEMPERATURE: 98 F | OXYGEN SATURATION: 96 % | RESPIRATION RATE: 18 BRPM | DIASTOLIC BLOOD PRESSURE: 90 MMHG

## 2024-06-07 LAB
ALBUMIN SERPL ELPH-MCNC: 4 G/DL — SIGNIFICANT CHANGE UP (ref 3.3–5)
ALP SERPL-CCNC: 76 U/L — SIGNIFICANT CHANGE UP (ref 40–120)
ALT FLD-CCNC: 29 U/L — SIGNIFICANT CHANGE UP (ref 10–45)
ANION GAP SERPL CALC-SCNC: 10 MMOL/L — SIGNIFICANT CHANGE UP (ref 5–17)
APTT BLD: 30.2 SEC — SIGNIFICANT CHANGE UP (ref 24.5–35.6)
AST SERPL-CCNC: 44 U/L — HIGH (ref 10–40)
BASOPHILS # BLD AUTO: 0.03 K/UL — SIGNIFICANT CHANGE UP (ref 0–0.2)
BASOPHILS NFR BLD AUTO: 0.3 % — SIGNIFICANT CHANGE UP (ref 0–2)
BILIRUB SERPL-MCNC: 0.2 MG/DL — SIGNIFICANT CHANGE UP (ref 0.2–1.2)
BUN SERPL-MCNC: 18 MG/DL — SIGNIFICANT CHANGE UP (ref 7–23)
CALCIUM SERPL-MCNC: 10.4 MG/DL — SIGNIFICANT CHANGE UP (ref 8.4–10.5)
CHLORIDE SERPL-SCNC: 104 MMOL/L — SIGNIFICANT CHANGE UP (ref 96–108)
CO2 SERPL-SCNC: 24 MMOL/L — SIGNIFICANT CHANGE UP (ref 22–31)
CREAT SERPL-MCNC: 1.02 MG/DL — SIGNIFICANT CHANGE UP (ref 0.5–1.3)
EGFR: 60 ML/MIN/1.73M2 — SIGNIFICANT CHANGE UP
EOSINOPHIL # BLD AUTO: 0.27 K/UL — SIGNIFICANT CHANGE UP (ref 0–0.5)
EOSINOPHIL NFR BLD AUTO: 2.4 % — SIGNIFICANT CHANGE UP (ref 0–6)
GLUCOSE SERPL-MCNC: 120 MG/DL — HIGH (ref 70–99)
HCT VFR BLD CALC: 38.1 % — SIGNIFICANT CHANGE UP (ref 34.5–45)
HGB BLD-MCNC: 12.4 G/DL — SIGNIFICANT CHANGE UP (ref 11.5–15.5)
IMM GRANULOCYTES NFR BLD AUTO: 0.4 % — SIGNIFICANT CHANGE UP (ref 0–0.9)
INR BLD: 0.95 RATIO — SIGNIFICANT CHANGE UP (ref 0.85–1.18)
LYMPHOCYTES # BLD AUTO: 2.36 K/UL — SIGNIFICANT CHANGE UP (ref 1–3.3)
LYMPHOCYTES # BLD AUTO: 20.8 % — SIGNIFICANT CHANGE UP (ref 13–44)
MCHC RBC-ENTMCNC: 25.1 PG — LOW (ref 27–34)
MCHC RBC-ENTMCNC: 32.5 GM/DL — SIGNIFICANT CHANGE UP (ref 32–36)
MCV RBC AUTO: 77 FL — LOW (ref 80–100)
MONOCYTES # BLD AUTO: 0.89 K/UL — SIGNIFICANT CHANGE UP (ref 0–0.9)
MONOCYTES NFR BLD AUTO: 7.8 % — SIGNIFICANT CHANGE UP (ref 2–14)
NEUTROPHILS # BLD AUTO: 7.78 K/UL — HIGH (ref 1.8–7.4)
NEUTROPHILS NFR BLD AUTO: 68.3 % — SIGNIFICANT CHANGE UP (ref 43–77)
NRBC # BLD: 0 /100 WBCS — SIGNIFICANT CHANGE UP (ref 0–0)
PLATELET # BLD AUTO: 239 K/UL — SIGNIFICANT CHANGE UP (ref 150–400)
POTASSIUM SERPL-MCNC: 4.5 MMOL/L — SIGNIFICANT CHANGE UP (ref 3.5–5.3)
POTASSIUM SERPL-SCNC: 4.5 MMOL/L — SIGNIFICANT CHANGE UP (ref 3.5–5.3)
PROT SERPL-MCNC: 7.6 G/DL — SIGNIFICANT CHANGE UP (ref 6–8.3)
PROTHROM AB SERPL-ACNC: 10.5 SEC — SIGNIFICANT CHANGE UP (ref 9.5–13)
RBC # BLD: 4.95 M/UL — SIGNIFICANT CHANGE UP (ref 3.8–5.2)
RBC # FLD: 14.6 % — HIGH (ref 10.3–14.5)
SODIUM SERPL-SCNC: 138 MMOL/L — SIGNIFICANT CHANGE UP (ref 135–145)
TROPONIN T, HIGH SENSITIVITY RESULT: <6 NG/L — SIGNIFICANT CHANGE UP (ref 0–51)
WBC # BLD: 11.37 K/UL — HIGH (ref 3.8–10.5)
WBC # FLD AUTO: 11.37 K/UL — HIGH (ref 3.8–10.5)

## 2024-06-07 PROCEDURE — 99285 EMERGENCY DEPT VISIT HI MDM: CPT | Mod: GC

## 2024-06-07 PROCEDURE — 70450 CT HEAD/BRAIN W/O DYE: CPT | Mod: 26,59,MC

## 2024-06-07 PROCEDURE — 93010 ELECTROCARDIOGRAM REPORT: CPT

## 2024-06-07 PROCEDURE — 70496 CT ANGIOGRAPHY HEAD: CPT | Mod: 26,MC

## 2024-06-07 PROCEDURE — 70498 CT ANGIOGRAPHY NECK: CPT | Mod: 26,MC

## 2024-06-07 RX ORDER — SODIUM CHLORIDE 9 MG/ML
1000 INJECTION INTRAMUSCULAR; INTRAVENOUS; SUBCUTANEOUS ONCE
Refills: 0 | Status: COMPLETED | OUTPATIENT
Start: 2024-06-07 | End: 2024-06-07

## 2024-06-07 RX ORDER — METOCLOPRAMIDE HCL 10 MG
10 TABLET ORAL ONCE
Refills: 0 | Status: COMPLETED | OUTPATIENT
Start: 2024-06-07 | End: 2024-06-07

## 2024-06-07 RX ADMIN — SODIUM CHLORIDE 1000 MILLILITER(S): 9 INJECTION INTRAMUSCULAR; INTRAVENOUS; SUBCUTANEOUS at 21:27

## 2024-06-07 RX ADMIN — Medication 10 MILLIGRAM(S): at 20:20

## 2024-06-07 NOTE — ED ADULT TRIAGE NOTE - WEIGHT IN KG

## 2024-06-07 NOTE — ED PROVIDER NOTE - PATIENT PORTAL LINK FT
You can access the FollowMyHealth Patient Portal offered by [facility] by registering at the following website: [email or web address]. By joining [employer]’s FollowMyHealth portal, you will also be able to view your health information using other applications (apps) compatible with our system.

## 2024-06-07 NOTE — ED ADULT NURSE NOTE - NS ED NOTE ABUSE RESPONSE YN
Take your blood pressure each morning and evening for 1 week.  Record the blood pressure, pulse, date and time (AM or PM) and then send the the results all together at 1 time to Dr. Wilcox.    
Yes

## 2024-06-07 NOTE — ED PROVIDER NOTE - ATTENDING CONTRIBUTION TO CARE
This is a 69-year-old female who is coming in with 2 days of intermittent dizziness lasting 15 to 30 minutes with quick positional movements and occasionally without.  Vomiting.  Headache that was slow in onset for about a day and a half.  Vomited once.  Headache is now left-sided.  No diplopia or dysphagia.  No weakness to arms or legs.  She is able to sit erect in the bed.  No nystagmus.  Regular rate and rhythm clear lungs nontender abdomen.  Unclear the cause of the patient's symptoms.  For now we will do CT CTA of the head and neck.  CBC CMP EKG cardiac monitor.  May require neuro consult if the patient is not feeling better.  Reglan IV.

## 2024-06-07 NOTE — ED ADULT NURSE NOTE - OBJECTIVE STATEMENT
68YO female with pmhx of HTN, daily ASA and arthritis comes to the ED via ems from home with c/o dizziness. Pt endorses dizziness for 4 days intermittently, states episodes last for approx. 10 seconds describes dizziness as "room spinning". As per EMS pt had one episode of emesis. Pt c/o HA primarily on top of her head. Pt is A&Ox4, respirations are even and nonlabored, radial pulses are strong and equal, NSR on cardiac monitor lead 2, pt is able to ambulate with a steady gait. Pt denies fevers, chills, sob, neck stiffness or recent injuries. Pt placed in position of comfort. Pt educated on call bell system and provided call bell. Bed in lowest position, wheels locked, appropriate side rails raised. Pt denies needs at this time.

## 2024-06-07 NOTE — ED PROVIDER NOTE - NSFOLLOWUPCLINICS_GEN_ALL_ED_FT
[facility]  Neurology  [address] [place] [address] - [address] [place] [address]  Phone: [phone]  Fax:   Follow Up Time: Urgent

## 2024-06-07 NOTE — ED PROVIDER NOTE - CLINICAL SUMMARY MEDICAL DECISION MAKING FREE TEXT BOX
69-year-old female past medical history of hypertension and rheumatoid arthritis presents to the ED for dizziness since Tuesday.  Patient states episode of the last couple minutes in duration and associated with quick positional movements.1 episode of nonbloody/nonbilious vomiting earlier today. pt also endorsing headache that started today.  no ear pain, no recent fevers. denies cough, congestion, abdominal pain, or urinary symptoms.    Differential diagnosis include but not limited to peripheral vertigo versus central.  Will treat with fluids, meclizine Reglan reassess.  Will get head CT to evaluate for  acute intracranial pathology. Labs including CBC and CMP to evaluate for infectious cause or electrolyte derangement.  Trop, EKG to evaluate for cardiac etiology

## 2024-06-07 NOTE — ED PROVIDER NOTE - NSFOLLOWUPINSTRUCTIONS_ED_ALL_ED_FT
1. TAKE ALL MEDICATIONS AS DIRECTED.    2. FOR PAIN OR FEVER YOU CAN TAKE IBUPROFEN (MOTRIN, ADVIL) 600mg every 6 hours OR ACETAMINOPHEN (TYLENOL) 975mg every 6 hours AS NEEDED, AS DIRECTED ON PACKAGING.  3. FOLLOW UP WITH A NEUROLOGY DOCTOR AS DIRECTED.  4. IF YOU HAD LABS OR IMAGING DONE, YOU WERE GIVEN COPIES OF ALL LABS AND/OR IMAGING RESULTS FROM YOUR ER VISIT--PLEASE TAKE THEM WITH YOU TO YOUR FOLLOW UP APPOINTMENTS.   To obtain a copy of your medical records or disc, please contact medical records during the hours of operation (Monday - Friday 8am - 7pm; Saturday 8am - 4pm) at Phone: [phone]   5. RETURN TO THE ER FOR ANY WORSENING SYMPTOMS OR CONCERNS.    Dizziness    Dizziness can manifest as a feeling of unsteadiness or light-headedness. You may feel like you are about to faint. This condition can be caused by a number of things, including medicines, dehydration, or illness. Drink enough fluid to keep your urine clear or pale yellow. Do not drink alcohol and limit your caffeine intake. Avoid quick or sudden movements.  Rise slowly from chairs and steady yourself until you feel okay. In the morning, first sit up on the side of the bed.    SEEK IMMEDIATE MEDICAL CARE IF YOU HAVE ANY OF THE FOLLOWING SYMPTOMS: vomiting, changes in your vision or speech, weakness in your arms or legs, trouble speaking or swallowing, chest pain, abdominal pain, shortness of breath, sweating, bleeding, headache, neck pain, or fever.

## 2024-06-07 NOTE — ED PROVIDER NOTE - PHYSICAL EXAMINATION
Physical Exam:  Gen:  Well appearing, awake, alert, non-toxic appearing  Head: NCAT  HEENT: Normal conjunctiva, trachea midline, moist mucous membranes  Lung: CTAB, no respiratory distress, no wheezes/rhonchi/rales B/L, speaking in full sentences  CV: RRR, no murmurs, rubs or gallops  Abd: Soft, non-tender, non-distended,   MSK: No visible deformities, moves all four extremities, no muscle or joint tenderness  Neuro: No focal motor deficits  Cranial Nerves:  --CN II: PERRL 3mm  --CN III, IV, VI: EOMI bilateral no nystagmus  --CN V1-3: Facial sensation intact to touch  --CN VII: No facial asymmetry or droop  --CN VIII: Hearing intact to rubbing fingers b/l  --CN IX, X: Palate elevates symmetrically. Normal phonation  --CN XI: Heading turning and shoulder shrug intact b/l  --CN XII: Tongue midline with normal movements  Skin: Warm, well perfused, no visible rashes, no leg swelling  Psych: appropriate affect and mood

## 2024-06-07 NOTE — ED ADULT NURSE NOTE - NSFALLUNIVINTERV_ED_ALL_ED
Bed/Stretcher in lowest position, wheels locked, appropriate side rails in place/Call bell, personal items and telephone in reach/Instruct patient to call for assistance before getting out of bed/chair/stretcher/Non-slip footwear applied when patient is off stretcher/Wilber to call system/Physically safe environment - no spills, clutter or unnecessary equipment/Purposeful proactive rounding/Room/bathroom lighting operational, light cord in reach

## 2024-06-07 NOTE — ED PROVIDER NOTE - OBJECTIVE STATEMENT
69-year-old female past medical history of hypertension and rheumatoid arthritis presents to the ED for dizziness since Tuesday.  Patient states episode of the last couple minutes in duration and associated with quick positional movements.1 episode of nonbloody/nonbilious vomiting earlier today. pt also endorsing headache that started today.  no ear pain, no recent fevers. denies cough, congestion, abdominal pain, or urinary symptoms.

## 2024-06-07 NOTE — ED ADULT TRIAGE NOTE - NS ED NURSE AMBULANCES
Ice initially for 20 minutes 3-4 times a day then  may alternate with heat and ice 20 minutes 3-4 times a day.  Then go to heat 20 minutes 3-4 times a day        Rest    Recommended to gradaully increase range of motion as tolerated and to use proper transfer techniques.    Expect gradual improvement on medications.  Avoid heavy exertion, lifting heavy objects, bending, stooping, twisting and overhead work/activity until symptoms are fully resolved.     Follow up with your Primary Care Physician in 7-10 days.    If sudden worsening or onset of new symptoms such as numbness/tingling to extremities, weakness to extremities or loss of stool/urine then must seek medical care immediately in the ER.         
[facility]

## 2024-06-07 NOTE — ED PROVIDER NOTE - PROGRESS NOTE DETAILS
Results reviewed patient ambulatory without assistance asymptomatic at present discharge follow-up as needed

## 2024-06-08 LAB
APPEARANCE UR: CLEAR — SIGNIFICANT CHANGE UP
BACTERIA # UR AUTO: NEGATIVE /HPF — SIGNIFICANT CHANGE UP
BILIRUB UR-MCNC: NEGATIVE — SIGNIFICANT CHANGE UP
CAST: 0 /LPF — SIGNIFICANT CHANGE UP (ref 0–4)
COLOR SPEC: YELLOW — SIGNIFICANT CHANGE UP
DIFF PNL FLD: NEGATIVE — SIGNIFICANT CHANGE UP
GLUCOSE UR QL: NEGATIVE MG/DL — SIGNIFICANT CHANGE UP
KETONES UR-MCNC: NEGATIVE MG/DL — SIGNIFICANT CHANGE UP
LEUKOCYTE ESTERASE UR-ACNC: ABNORMAL
NITRITE UR-MCNC: NEGATIVE — SIGNIFICANT CHANGE UP
PH UR: 7 — SIGNIFICANT CHANGE UP (ref 5–8)
PROT UR-MCNC: NEGATIVE MG/DL — SIGNIFICANT CHANGE UP
RBC CASTS # UR COMP ASSIST: 0 /HPF — SIGNIFICANT CHANGE UP (ref 0–4)
REVIEW: SIGNIFICANT CHANGE UP
SP GR SPEC: 1.03 — SIGNIFICANT CHANGE UP (ref 1–1.03)
SQUAMOUS # UR AUTO: 1 /HPF — SIGNIFICANT CHANGE UP (ref 0–5)
UROBILINOGEN FLD QL: 0.2 MG/DL — SIGNIFICANT CHANGE UP (ref 0.2–1)
WBC UR QL: 5 /HPF — SIGNIFICANT CHANGE UP (ref 0–5)

## 2024-06-08 PROCEDURE — 70498 CT ANGIOGRAPHY NECK: CPT | Mod: MC

## 2024-06-08 PROCEDURE — 96374 THER/PROPH/DIAG INJ IV PUSH: CPT | Mod: XU

## 2024-06-08 PROCEDURE — 93005 ELECTROCARDIOGRAM TRACING: CPT

## 2024-06-08 PROCEDURE — 84484 ASSAY OF TROPONIN QUANT: CPT

## 2024-06-08 PROCEDURE — 70450 CT HEAD/BRAIN W/O DYE: CPT | Mod: MC

## 2024-06-08 PROCEDURE — 80053 COMPREHEN METABOLIC PANEL: CPT

## 2024-06-08 PROCEDURE — 70496 CT ANGIOGRAPHY HEAD: CPT | Mod: MC

## 2024-06-08 PROCEDURE — 85730 THROMBOPLASTIN TIME PARTIAL: CPT

## 2024-06-08 PROCEDURE — 85610 PROTHROMBIN TIME: CPT

## 2024-06-08 PROCEDURE — 81001 URINALYSIS AUTO W/SCOPE: CPT

## 2024-06-08 PROCEDURE — 85025 COMPLETE CBC W/AUTO DIFF WBC: CPT

## 2024-06-08 PROCEDURE — 99285 EMERGENCY DEPT VISIT HI MDM: CPT | Mod: 25

## 2024-06-08 RX ORDER — MECLIZINE HCL 12.5 MG
1 TABLET ORAL
Qty: 6 | Refills: 0
Start: 2024-06-08 | End: 2024-06-09

## 2024-07-16 ENCOUNTER — OUTPATIENT (OUTPATIENT)
Dept: OUTPATIENT SERVICES | Facility: HOSPITAL | Age: 69
LOS: 1 days | End: 2024-07-16
Payer: MEDICARE

## 2024-07-16 ENCOUNTER — APPOINTMENT (OUTPATIENT)
Dept: NEUROLOGY | Facility: HOSPITAL | Age: 69
End: 2024-07-16
Payer: MEDICARE

## 2024-07-16 VITALS
SYSTOLIC BLOOD PRESSURE: 145 MMHG | TEMPERATURE: 98.3 F | DIASTOLIC BLOOD PRESSURE: 85 MMHG | HEART RATE: 76 BPM | RESPIRATION RATE: 18 BRPM | WEIGHT: 191 LBS | HEIGHT: 65 IN | OXYGEN SATURATION: 97 % | BODY MASS INDEX: 31.82 KG/M2

## 2024-07-16 DIAGNOSIS — R51.9 HEADACHE, UNSPECIFIED: ICD-10-CM

## 2024-07-16 DIAGNOSIS — H81.10 BENIGN PAROXYSMAL VERTIGO, UNSPECIFIED EAR: ICD-10-CM

## 2024-07-16 PROCEDURE — G2211 COMPLEX E/M VISIT ADD ON: CPT

## 2024-07-16 PROCEDURE — 99204 OFFICE O/P NEW MOD 45 MIN: CPT

## 2024-07-16 PROCEDURE — G0463: CPT

## 2024-07-16 RX ORDER — HYDROCHLOROTHIAZIDE 12.5 MG/1
12.5 TABLET ORAL
Refills: 0 | Status: ACTIVE | COMMUNITY
Start: 2024-07-16

## 2024-07-16 RX ORDER — TELMISARTAN 80 MG/1
80 TABLET ORAL
Refills: 0 | Status: ACTIVE | COMMUNITY
Start: 2024-07-16

## 2024-07-17 DIAGNOSIS — Z71.9 COUNSELING, UNSPECIFIED: ICD-10-CM

## 2024-07-17 DIAGNOSIS — R42 DIZZINESS AND GIDDINESS: ICD-10-CM

## 2024-07-22 ENCOUNTER — APPOINTMENT (OUTPATIENT)
Dept: RADIOLOGY | Facility: IMAGING CENTER | Age: 69
End: 2024-07-22
Payer: MEDICARE

## 2024-07-22 PROCEDURE — 77080 DXA BONE DENSITY AXIAL: CPT | Mod: 26

## 2024-10-16 ENCOUNTER — OUTPATIENT (OUTPATIENT)
Dept: OUTPATIENT SERVICES | Facility: HOSPITAL | Age: 69
LOS: 1 days | End: 2024-10-16
Payer: MEDICARE

## 2024-10-16 ENCOUNTER — APPOINTMENT (OUTPATIENT)
Dept: ULTRASOUND IMAGING | Facility: IMAGING CENTER | Age: 69
End: 2024-10-16
Payer: MEDICARE

## 2024-10-16 DIAGNOSIS — Z00.8 ENCOUNTER FOR OTHER GENERAL EXAMINATION: ICD-10-CM

## 2024-10-16 PROCEDURE — 76700 US EXAM ABDOM COMPLETE: CPT | Mod: 26

## 2024-10-23 ENCOUNTER — APPOINTMENT (OUTPATIENT)
Dept: ORTHOPEDIC SURGERY | Facility: CLINIC | Age: 69
End: 2024-10-23
Payer: MEDICARE

## 2024-10-23 ENCOUNTER — NON-APPOINTMENT (OUTPATIENT)
Age: 69
End: 2024-10-23

## 2024-10-23 VITALS — BODY MASS INDEX: 31.82 KG/M2 | WEIGHT: 191 LBS | HEIGHT: 65 IN

## 2024-10-23 DIAGNOSIS — M17.12 UNILATERAL PRIMARY OSTEOARTHRITIS, LEFT KNEE: ICD-10-CM

## 2024-10-23 PROCEDURE — 76882 US LMTD JT/FCL EVL NVASC XTR: CPT | Mod: LT

## 2024-10-23 PROCEDURE — 99204 OFFICE O/P NEW MOD 45 MIN: CPT | Mod: 25

## 2024-10-23 PROCEDURE — 73564 X-RAY EXAM KNEE 4 OR MORE: CPT | Mod: LT

## 2024-11-20 ENCOUNTER — APPOINTMENT (OUTPATIENT)
Dept: CT IMAGING | Facility: CLINIC | Age: 69
End: 2024-11-20

## 2024-11-20 ENCOUNTER — OUTPATIENT (OUTPATIENT)
Dept: OUTPATIENT SERVICES | Facility: HOSPITAL | Age: 69
LOS: 1 days | End: 2024-11-20
Payer: MEDICARE

## 2024-11-20 DIAGNOSIS — Z00.00 ENCOUNTER FOR GENERAL ADULT MEDICAL EXAMINATION WITHOUT ABNORMAL FINDINGS: ICD-10-CM

## 2024-11-20 PROCEDURE — 74261 CT COLONOGRAPHY DX: CPT

## 2024-11-20 PROCEDURE — 74263 CT COLONOGRAPHY SCREENING: CPT | Mod: 26

## 2024-11-20 PROCEDURE — 74263 CT COLONOGRAPHY SCREENING: CPT

## 2024-11-20 PROCEDURE — 76700 US EXAM ABDOM COMPLETE: CPT

## 2024-12-04 ENCOUNTER — APPOINTMENT (OUTPATIENT)
Dept: ORTHOPEDIC SURGERY | Facility: CLINIC | Age: 69
End: 2024-12-04
Payer: MEDICARE

## 2024-12-04 DIAGNOSIS — M17.12 UNILATERAL PRIMARY OSTEOARTHRITIS, LEFT KNEE: ICD-10-CM

## 2024-12-04 PROCEDURE — 99214 OFFICE O/P EST MOD 30 MIN: CPT | Mod: 25

## 2024-12-04 PROCEDURE — 20610 DRAIN/INJ JOINT/BURSA W/O US: CPT | Mod: LT

## 2025-05-17 ENCOUNTER — APPOINTMENT (OUTPATIENT)
Dept: RADIOLOGY | Facility: IMAGING CENTER | Age: 70
End: 2025-05-17
Payer: MEDICARE

## 2025-05-17 ENCOUNTER — OUTPATIENT (OUTPATIENT)
Dept: OUTPATIENT SERVICES | Facility: HOSPITAL | Age: 70
LOS: 1 days | End: 2025-05-17
Payer: MEDICARE

## 2025-05-17 DIAGNOSIS — Z00.8 ENCOUNTER FOR OTHER GENERAL EXAMINATION: ICD-10-CM

## 2025-05-17 PROCEDURE — 71046 X-RAY EXAM CHEST 2 VIEWS: CPT

## 2025-05-17 PROCEDURE — 71046 X-RAY EXAM CHEST 2 VIEWS: CPT | Mod: 26

## 2025-05-21 ENCOUNTER — APPOINTMENT (OUTPATIENT)
Dept: MAMMOGRAPHY | Facility: IMAGING CENTER | Age: 70
End: 2025-05-21
Payer: MEDICARE

## 2025-05-21 ENCOUNTER — OUTPATIENT (OUTPATIENT)
Dept: OUTPATIENT SERVICES | Facility: HOSPITAL | Age: 70
LOS: 1 days | End: 2025-05-21
Payer: MEDICARE

## 2025-05-21 DIAGNOSIS — Z00.8 ENCOUNTER FOR OTHER GENERAL EXAMINATION: ICD-10-CM

## 2025-05-21 PROCEDURE — 77063 BREAST TOMOSYNTHESIS BI: CPT | Mod: 26

## 2025-05-21 PROCEDURE — 77067 SCR MAMMO BI INCL CAD: CPT

## 2025-05-21 PROCEDURE — 77067 SCR MAMMO BI INCL CAD: CPT | Mod: 26

## 2025-05-21 PROCEDURE — 77063 BREAST TOMOSYNTHESIS BI: CPT
